# Patient Record
Sex: FEMALE | Race: WHITE | URBAN - METROPOLITAN AREA
[De-identification: names, ages, dates, MRNs, and addresses within clinical notes are randomized per-mention and may not be internally consistent; named-entity substitution may affect disease eponyms.]

---

## 2018-07-20 ENCOUNTER — DOCTOR'S OFFICE (OUTPATIENT)
Dept: URBAN - METROPOLITAN AREA CLINIC 137 | Facility: CLINIC | Age: 43
Setting detail: OPHTHALMOLOGY
End: 2018-07-20
Payer: COMMERCIAL

## 2018-07-20 DIAGNOSIS — H52.13: ICD-10-CM

## 2018-07-20 PROCEDURE — 92014 COMPRE OPH EXAM EST PT 1/>: CPT | Performed by: OPHTHALMOLOGY

## 2018-07-20 ASSESSMENT — REFRACTION_OUTSIDERX
OD_VA1: 20/20
OU_VA: 20/20
OS_CYLINDER: +1.75
OD_VA3: 20/
OD_VA2: 20/20(J1+)
OS_VA1: 20/20
OD_AXIS: 070
OS_SPHERE: -2.00
OD_SPHERE: -2.75
OS_AXIS: 155
OS_VA2: 20/20(J1+)
OD_CYLINDER: +1.00
OS_VA3: 20/

## 2018-07-20 ASSESSMENT — REFRACTION_MANIFEST
OD_VA1: 20/25
OS_CYLINDER: +1.75
OS_VA2: 20/
OD_VA1: 20/
OS_VA1: 20/25
OU_VA: 20/
OD_VA2: 20/
OS_VA3: 20/
OS_SPHERE: -1.25
OS_AXIS: 169
OD_VA3: 20/
OS_VA3: 20/
OS_VA1: 20/
OD_SPHERE: -2.25
OD_VA2: 20/
OS_VA2: 20/
OD_CYLINDER: SPH
OD_VA3: 20/
OU_VA: 20/

## 2018-07-20 ASSESSMENT — REFRACTION_AUTOREFRACTION
OD_AXIS: 066
OS_SPHERE: -3.75
OD_CYLINDER: +0.50
OS_AXIS: 166
OS_CYLINDER: +2.00
OD_SPHERE: -3.25

## 2018-07-20 ASSESSMENT — REFRACTION_CURRENTRX
OD_OVR_VA: 20/
OS_OVR_VA: 20/
OS_OVR_VA: 20/
OD_OVR_VA: 20/
OD_OVR_VA: 20/
OS_OVR_VA: 20/

## 2018-07-20 ASSESSMENT — VISUAL ACUITY
OD_BCVA: 20/50-2
OS_BCVA: 20/40-2

## 2018-07-20 ASSESSMENT — SPHEQUIV_DERIVED
OS_SPHEQUIV: -2.75
OD_SPHEQUIV: -3
OS_SPHEQUIV: -0.375

## 2018-07-20 ASSESSMENT — CONFRONTATIONAL VISUAL FIELD TEST (CVF)
OS_FINDINGS: FULL
OD_FINDINGS: FULL

## 2018-11-12 ENCOUNTER — DOCTOR'S OFFICE (OUTPATIENT)
Dept: URBAN - METROPOLITAN AREA CLINIC 136 | Facility: CLINIC | Age: 43
Setting detail: OPHTHALMOLOGY
End: 2018-11-12
Payer: COMMERCIAL

## 2018-11-12 ENCOUNTER — RX ONLY (RX ONLY)
Age: 43
End: 2018-11-12

## 2018-11-12 DIAGNOSIS — H20.012: ICD-10-CM

## 2018-11-12 PROCEDURE — 92014 COMPRE OPH EXAM EST PT 1/>: CPT | Performed by: OPTOMETRIST

## 2018-11-12 ASSESSMENT — REFRACTION_CURRENTRX
OS_OVR_VA: 20/
OS_OVR_VA: 20/
OD_OVR_VA: 20/
OD_OVR_VA: 20/
OS_OVR_VA: 20/
OD_OVR_VA: 20/

## 2018-11-12 ASSESSMENT — REFRACTION_MANIFEST
OS_CYLINDER: +1.75
OS_CYLINDER: +1.75
OS_VA2: 20/20(J1+)
OD_VA3: 20/
OS_AXIS: 155
OD_VA2: 20/20(J1+)
OU_VA: 20/20
OS_VA2: 20/
OD_VA1: 20/25
OU_VA: 20/
OD_CYLINDER: SPH
OD_SPHERE: -2.25
OD_AXIS: 070
OS_SPHERE: -1.25
OD_SPHERE: -2.75
OD_VA1: 20/20
OD_VA2: 20/
OS_SPHERE: -2.00
OS_VA3: 20/
OS_VA3: 20/
OS_VA1: 20/25
OD_CYLINDER: +1.00
OS_VA1: 20/20
OS_AXIS: 169
OD_VA3: 20/

## 2018-11-12 ASSESSMENT — REFRACTION_AUTOREFRACTION
OD_CYLINDER: +0.50
OD_SPHERE: -3.25
OS_SPHERE: -3.75
OS_CYLINDER: +2.00
OS_AXIS: 166
OD_AXIS: 066

## 2018-11-12 ASSESSMENT — CONFRONTATIONAL VISUAL FIELD TEST (CVF)
OS_FINDINGS: FULL
OD_FINDINGS: FULL

## 2018-11-12 ASSESSMENT — SPHEQUIV_DERIVED
OS_SPHEQUIV: -0.375
OS_SPHEQUIV: -1.125
OS_SPHEQUIV: -2.75
OD_SPHEQUIV: -2.25
OD_SPHEQUIV: -3

## 2018-11-12 ASSESSMENT — VISUAL ACUITY
OS_BCVA: 20/30-2
OD_BCVA: 20/50-

## 2018-11-16 ENCOUNTER — DOCTOR'S OFFICE (OUTPATIENT)
Dept: URBAN - METROPOLITAN AREA CLINIC 137 | Facility: CLINIC | Age: 43
Setting detail: OPHTHALMOLOGY
End: 2018-11-16
Payer: COMMERCIAL

## 2018-11-16 ENCOUNTER — RX ONLY (RX ONLY)
Age: 43
End: 2018-11-16

## 2018-11-16 DIAGNOSIS — H20.012: ICD-10-CM

## 2018-11-16 PROCEDURE — 99213 OFFICE O/P EST LOW 20 MIN: CPT | Performed by: OPTOMETRIST

## 2018-11-16 ASSESSMENT — REFRACTION_MANIFEST
OS_SPHERE: -1.25
OS_SPHERE: -2.00
OS_VA1: 20/25
OS_VA3: 20/
OD_VA2: 20/20(J1+)
OD_CYLINDER: +1.00
OS_CYLINDER: +1.75
OD_SPHERE: -2.25
OD_VA2: 20/
OS_AXIS: 155
OS_VA3: 20/
OS_AXIS: 169
OS_VA1: 20/20
OS_VA2: 20/
OD_SPHERE: -2.75
OD_VA3: 20/
OU_VA: 20/
OD_VA1: 20/25
OD_VA3: 20/
OD_VA1: 20/20
OS_VA2: 20/20(J1+)
OS_CYLINDER: +1.75
OU_VA: 20/20
OD_CYLINDER: SPH
OD_AXIS: 070

## 2018-11-16 ASSESSMENT — REFRACTION_CURRENTRX
OS_OVR_VA: 20/
OD_OVR_VA: 20/
OS_OVR_VA: 20/
OS_OVR_VA: 20/

## 2018-11-16 ASSESSMENT — REFRACTION_AUTOREFRACTION
OD_SPHERE: -3.25
OD_AXIS: 066
OS_SPHERE: -3.75
OS_AXIS: 166
OD_CYLINDER: +0.50
OS_CYLINDER: +2.00

## 2018-11-16 ASSESSMENT — SPHEQUIV_DERIVED
OS_SPHEQUIV: -2.75
OS_SPHEQUIV: -1.125
OD_SPHEQUIV: -2.25
OS_SPHEQUIV: -0.375
OD_SPHEQUIV: -3

## 2018-11-16 ASSESSMENT — VISUAL ACUITY
OD_BCVA: 20/300
OS_BCVA: 20/40

## 2018-11-21 ENCOUNTER — DOCTOR'S OFFICE (OUTPATIENT)
Dept: URBAN - METROPOLITAN AREA CLINIC 137 | Facility: CLINIC | Age: 43
Setting detail: OPHTHALMOLOGY
End: 2018-11-21
Payer: COMMERCIAL

## 2018-11-21 DIAGNOSIS — H20.012: ICD-10-CM

## 2018-11-21 PROCEDURE — 99213 OFFICE O/P EST LOW 20 MIN: CPT | Performed by: OPTOMETRIST

## 2018-11-21 ASSESSMENT — SPHEQUIV_DERIVED
OS_SPHEQUIV: -0.375
OD_SPHEQUIV: -2.25
OS_SPHEQUIV: -2.75
OD_SPHEQUIV: -3
OS_SPHEQUIV: -1.125

## 2018-11-21 ASSESSMENT — VISUAL ACUITY
OS_BCVA: 20/40
OD_BCVA: 20/300

## 2018-11-21 ASSESSMENT — REFRACTION_CURRENTRX
OS_OVR_VA: 20/
OD_OVR_VA: 20/
OS_OVR_VA: 20/
OD_OVR_VA: 20/
OS_OVR_VA: 20/
OD_OVR_VA: 20/

## 2018-11-21 ASSESSMENT — REFRACTION_MANIFEST
OD_CYLINDER: SPH
OD_VA1: 20/25
OD_VA1: 20/20
OD_VA3: 20/
OU_VA: 20/
OS_CYLINDER: +1.75
OD_VA2: 20/
OS_VA3: 20/
OS_SPHERE: -2.00
OS_VA2: 20/
OS_VA2: 20/20(J1+)
OU_VA: 20/20
OD_VA2: 20/20(J1+)
OD_SPHERE: -2.75
OS_AXIS: 155
OD_CYLINDER: +1.00
OS_CYLINDER: +1.75
OD_SPHERE: -2.25
OS_SPHERE: -1.25
OD_AXIS: 070
OS_AXIS: 169
OD_VA3: 20/
OS_VA1: 20/25
OS_VA3: 20/
OS_VA1: 20/20

## 2018-11-21 ASSESSMENT — REFRACTION_AUTOREFRACTION
OS_SPHERE: -3.75
OS_CYLINDER: +2.00
OD_CYLINDER: +0.50
OS_AXIS: 166
OD_SPHERE: -3.25
OD_AXIS: 066

## 2018-11-28 ENCOUNTER — DOCTOR'S OFFICE (OUTPATIENT)
Dept: URBAN - METROPOLITAN AREA CLINIC 137 | Facility: CLINIC | Age: 43
Setting detail: OPHTHALMOLOGY
End: 2018-11-28
Payer: COMMERCIAL

## 2018-11-28 DIAGNOSIS — H20.012: ICD-10-CM

## 2018-11-28 PROCEDURE — 99212 OFFICE O/P EST SF 10 MIN: CPT | Performed by: OPTOMETRIST

## 2018-11-28 ASSESSMENT — CONFRONTATIONAL VISUAL FIELD TEST (CVF)
OS_FINDINGS: FULL
OD_FINDINGS: FULL

## 2018-11-28 ASSESSMENT — REFRACTION_MANIFEST
OD_VA1: 20/25
OS_AXIS: 065
OU_VA: 20/20
OD_SPHERE: -2.25
OD_CYLINDER: -1.00
OS_VA3: 20/
OD_CYLINDER: SPH
OS_CYLINDER: -1.75
OD_VA1: 20/20
OS_SPHERE: -0.25
OS_VA2: 20/
OD_AXIS: 160
OS_VA1: 20/25
OD_VA3: 20/
OD_VA2: 20/20(J1+)
OD_VA2: 20/
OS_VA3: 20/
OS_SPHERE: +0.50
OD_VA3: 20/
OS_VA1: 20/20
OS_AXIS: 079
OU_VA: 20/
OS_CYLINDER: -1.75
OD_SPHERE: -1.75
OS_VA2: 20/20(J1+)

## 2018-11-28 ASSESSMENT — SPHEQUIV_DERIVED
OD_SPHEQUIV: -3
OD_SPHEQUIV: -2.25
OS_SPHEQUIV: -2.75
OS_SPHEQUIV: -0.375
OS_SPHEQUIV: -1.125

## 2018-11-28 ASSESSMENT — REFRACTION_CURRENTRX
OS_OVR_VA: 20/
OD_OVR_VA: 20/

## 2018-11-28 ASSESSMENT — REFRACTION_AUTOREFRACTION
OS_CYLINDER: +2.00
OD_AXIS: 066
OS_AXIS: 166
OD_CYLINDER: +0.50
OS_SPHERE: -3.75
OD_SPHERE: -3.25

## 2018-11-28 ASSESSMENT — VISUAL ACUITY
OD_BCVA: 20/100
OS_BCVA: 20/80+2

## 2019-06-07 ENCOUNTER — DOCTOR'S OFFICE (OUTPATIENT)
Dept: URBAN - METROPOLITAN AREA CLINIC 137 | Facility: CLINIC | Age: 44
Setting detail: OPHTHALMOLOGY
End: 2019-06-07
Payer: COMMERCIAL

## 2019-06-07 DIAGNOSIS — H20.012: ICD-10-CM

## 2019-06-07 PROCEDURE — 99213 OFFICE O/P EST LOW 20 MIN: CPT | Performed by: OPTOMETRIST

## 2019-06-07 ASSESSMENT — REFRACTION_MANIFEST
OU_VA: 20/
OD_VA1: 20/20
OS_CYLINDER: -1.75
OS_SPHERE: -0.25
OS_VA3: 20/
OD_VA2: 20/20(J1+)
OD_VA3: 20/
OD_CYLINDER: SPH
OS_AXIS: 079
OD_VA3: 20/
OS_VA3: 20/
OS_VA1: 20/25
OD_SPHERE: -1.75
OD_VA2: 20/
OS_CYLINDER: -1.75
OS_VA2: 20/20(J1+)
OU_VA: 20/20
OD_CYLINDER: -1.00
OD_SPHERE: -2.25
OS_SPHERE: +0.50
OS_VA1: 20/20
OD_AXIS: 160
OD_VA1: 20/25
OS_AXIS: 065
OS_VA2: 20/

## 2019-06-07 ASSESSMENT — SPHEQUIV_DERIVED
OD_SPHEQUIV: -2.25
OD_SPHEQUIV: -3
OS_SPHEQUIV: -0.375
OS_SPHEQUIV: -1.125
OS_SPHEQUIV: -2.75

## 2019-06-07 ASSESSMENT — VISUAL ACUITY
OD_BCVA: 20/100
OS_BCVA: 20/80+2

## 2019-06-07 ASSESSMENT — REFRACTION_CURRENTRX
OS_OVR_VA: 20/
OD_OVR_VA: 20/
OS_OVR_VA: 20/
OS_OVR_VA: 20/
OD_OVR_VA: 20/
OD_OVR_VA: 20/

## 2019-06-07 ASSESSMENT — REFRACTION_AUTOREFRACTION
OS_CYLINDER: +2.00
OD_SPHERE: -3.25
OD_AXIS: 066
OS_SPHERE: -3.75
OD_CYLINDER: +0.50
OS_AXIS: 166

## 2019-06-12 ENCOUNTER — DOCTOR'S OFFICE (OUTPATIENT)
Dept: URBAN - METROPOLITAN AREA CLINIC 137 | Facility: CLINIC | Age: 44
Setting detail: OPHTHALMOLOGY
End: 2019-06-12
Payer: COMMERCIAL

## 2019-06-12 DIAGNOSIS — H20.012: ICD-10-CM

## 2019-06-12 PROCEDURE — 99212 OFFICE O/P EST SF 10 MIN: CPT | Performed by: OPTOMETRIST

## 2019-06-12 ASSESSMENT — REFRACTION_MANIFEST
OS_VA3: 20/
OD_SPHERE: -1.75
OD_VA1: 20/25
OS_VA2: 20/
OS_VA3: 20/
OS_SPHERE: +0.50
OS_VA2: 20/20(J1+)
OD_CYLINDER: -1.00
OS_CYLINDER: -1.75
OS_AXIS: 079
OD_AXIS: 160
OU_VA: 20/
OD_VA3: 20/
OS_AXIS: 065
OS_CYLINDER: -1.75
OD_SPHERE: -2.25
OS_VA1: 20/20
OD_VA1: 20/20
OU_VA: 20/20
OD_VA2: 20/20(J1+)
OD_VA3: 20/
OD_VA2: 20/
OS_SPHERE: -0.25
OS_VA1: 20/25
OD_CYLINDER: SPH

## 2019-06-12 ASSESSMENT — CONFRONTATIONAL VISUAL FIELD TEST (CVF)
OD_FINDINGS: FULL
OS_FINDINGS: FULL

## 2019-06-12 ASSESSMENT — SPHEQUIV_DERIVED
OS_SPHEQUIV: -0.375
OD_SPHEQUIV: -3
OS_SPHEQUIV: -2.75
OD_SPHEQUIV: -2.25
OS_SPHEQUIV: -1.125

## 2019-06-12 ASSESSMENT — REFRACTION_AUTOREFRACTION
OD_AXIS: 066
OD_CYLINDER: +0.50
OS_SPHERE: -3.75
OD_SPHERE: -3.25
OS_CYLINDER: +2.00
OS_AXIS: 166

## 2019-06-12 ASSESSMENT — REFRACTION_CURRENTRX
OS_OVR_VA: 20/
OS_OVR_VA: 20/
OD_OVR_VA: 20/
OS_OVR_VA: 20/
OD_OVR_VA: 20/
OD_OVR_VA: 20/

## 2019-06-12 ASSESSMENT — VISUAL ACUITY
OS_BCVA: 20/80
OD_BCVA: 20/50

## 2020-02-26 ENCOUNTER — TRANSCRIBE ORDERS (OUTPATIENT)
Dept: ADMINISTRATIVE | Facility: HOSPITAL | Age: 45
End: 2020-02-26

## 2020-02-26 DIAGNOSIS — R05.9 COUGH: Primary | ICD-10-CM

## 2021-04-08 DIAGNOSIS — Z23 ENCOUNTER FOR IMMUNIZATION: ICD-10-CM

## 2022-02-12 PROBLEM — E55.9 VITAMIN D DEFICIENCY: Status: ACTIVE | Noted: 2022-02-12

## 2022-02-12 PROBLEM — R80.9 PROTEINURIA: Status: ACTIVE | Noted: 2022-02-12

## 2022-02-17 ENCOUNTER — OFFICE VISIT (OUTPATIENT)
Dept: FAMILY MEDICINE CLINIC | Facility: CLINIC | Age: 47
End: 2022-02-17
Payer: COMMERCIAL

## 2022-02-17 VITALS
HEART RATE: 68 BPM | BODY MASS INDEX: 31.36 KG/M2 | OXYGEN SATURATION: 99 % | HEIGHT: 63 IN | SYSTOLIC BLOOD PRESSURE: 118 MMHG | RESPIRATION RATE: 16 BRPM | DIASTOLIC BLOOD PRESSURE: 78 MMHG | WEIGHT: 177 LBS | TEMPERATURE: 97.1 F

## 2022-02-17 DIAGNOSIS — R79.89 LOW SERUM T4 LEVEL: Primary | ICD-10-CM

## 2022-02-17 DIAGNOSIS — E55.9 VITAMIN D DEFICIENCY: ICD-10-CM

## 2022-02-17 DIAGNOSIS — R80.9 PROTEINURIA, UNSPECIFIED TYPE: ICD-10-CM

## 2022-02-17 PROCEDURE — 3008F BODY MASS INDEX DOCD: CPT | Performed by: FAMILY MEDICINE

## 2022-02-17 PROCEDURE — 1036F TOBACCO NON-USER: CPT | Performed by: FAMILY MEDICINE

## 2022-02-17 PROCEDURE — 99214 OFFICE O/P EST MOD 30 MIN: CPT | Performed by: FAMILY MEDICINE

## 2022-02-17 PROCEDURE — 3725F SCREEN DEPRESSION PERFORMED: CPT | Performed by: FAMILY MEDICINE

## 2022-02-17 NOTE — PROGRESS NOTES
BMI Counseling: Body mass index is 31 35 kg/m²  The BMI is above normal  Nutrition recommendations include decreasing portion sizes, encouraging healthy choices of fruits and vegetables, consuming healthier snacks and moderation in carbohydrate intake  Exercise recommendations include exercising 3-5 times per week  No pharmacotherapy was ordered  Rationale for BMI follow-up plan is due to patient being overweight or obese  Depression Screening and Follow-up Plan: Patient was screened for depression during today's encounter  They screened negative with a PHQ-2 score of 0  Assessment/Plan:         Problem List Items Addressed This Visit        Other    Vitamin D deficiency     Level was 24 4 in Nov 2021  Pt now taking 2000 U qd and will recheck in 3 mths  Relevant Orders    Vitamin D 25 hydroxy    Proteinuria     Pt had positive protein in urine and positive microalbuminuria in Nov 2021  Will recheck and T/C referral to Nephrology  Relevant Orders    Microalbumin / creatinine urine ratio    UA (URINE) with reflex to Scope    Low serum T4 level - Primary     Level was 0 61 in Nov 2021  Recheck TSH and free T4  Relevant Orders    TSH, 3rd generation    T4, free            Subjective:      Patient ID: Juanjo Padilla is a 40 y o  female  Pt here for f/u abnormal labs  Pt had testing in Nov 2021 for executive physical  Pt was found to have low T4, low Vit D, and protein in urine  No cp/sob  No headaches  No abd pain  Energy ok  No FH of hypothyroidism or kidney isues  No frequent urination, back pain, or cloudy urine  No other c/o         The following portions of the patient's history were reviewed and updated as appropriate:   Past Medical History:  She has no past medical history on file ,  _______________________________________________________________________  Medical Problems:  does not have any pertinent problems on file ,  _______________________________________________________________________  Past Surgical History:   has a past surgical history that includes Joint replacement  ,  _______________________________________________________________________  Family History:  family history includes Hyperlipidemia in her mother; Lung cancer in her father ,  _______________________________________________________________________  Social History:   reports that she has never smoked  She has never used smokeless tobacco  She reports current alcohol use  She reports that she does not use drugs  ,  _______________________________________________________________________  Allergies:  has No Known Allergies     _______________________________________________________________________  No current outpatient medications on file  No current facility-administered medications for this visit      _______________________________________________________________________  Review of Systems   Constitutional: Negative for fatigue and unexpected weight change  Respiratory: Negative for cough, chest tightness and shortness of breath  Cardiovascular: Negative for chest pain and palpitations  Gastrointestinal: Negative for abdominal pain, constipation, diarrhea, nausea and vomiting  Genitourinary: Negative for difficulty urinating  Musculoskeletal: Negative for arthralgias  Skin: Negative for rash  Neurological: Negative for dizziness and headaches  Objective:  Vitals:    02/17/22 1453   BP: 118/78   BP Location: Left arm   Patient Position: Sitting   Cuff Size: Standard   Pulse: 68   Resp: 16   Temp: (!) 97 1 °F (36 2 °C)   TempSrc: Temporal   SpO2: 99%   Weight: 80 3 kg (177 lb)   Height: 5' 3" (1 6 m)     Body mass index is 31 35 kg/m²  Physical Exam  Vitals and nursing note reviewed  Constitutional:       Appearance: Normal appearance  She is well-developed  HENT:      Head: Normocephalic and atraumatic     Cardiovascular: Rate and Rhythm: Normal rate and regular rhythm  Heart sounds: Normal heart sounds  No murmur heard  Pulmonary:      Effort: Pulmonary effort is normal  No respiratory distress  Breath sounds: Normal breath sounds  No wheezing  Musculoskeletal:      Cervical back: Normal range of motion and neck supple  Right lower leg: No edema  Left lower leg: No edema  Lymphadenopathy:      Cervical: No cervical adenopathy  Neurological:      Mental Status: She is alert and oriented to person, place, and time  Psychiatric:         Mood and Affect: Mood normal          Behavior: Behavior normal          Thought Content:  Thought content normal          Judgment: Judgment normal

## 2022-02-17 NOTE — ASSESSMENT & PLAN NOTE
Pt had positive protein in urine and positive microalbuminuria in Nov 2021  Will recheck and T/C referral to Nephrology

## 2022-02-21 ENCOUNTER — TELEPHONE (OUTPATIENT)
Dept: FAMILY MEDICINE CLINIC | Facility: CLINIC | Age: 47
End: 2022-02-21

## 2022-03-01 DIAGNOSIS — N63.0 BREAST NODULE: Primary | ICD-10-CM

## 2022-03-10 ENCOUNTER — CLINICAL SUPPORT (OUTPATIENT)
Dept: FAMILY MEDICINE CLINIC | Facility: CLINIC | Age: 47
End: 2022-03-10

## 2022-03-10 DIAGNOSIS — R80.9 PROTEINURIA, UNSPECIFIED TYPE: Primary | ICD-10-CM

## 2022-03-10 LAB
BACTERIA UR QL AUTO: ABNORMAL /HPF
BILIRUB UR QL STRIP: NEGATIVE
CLARITY UR: CLEAR
COLOR UR: ABNORMAL
CREAT UR-MCNC: 171 MG/DL
GLUCOSE UR STRIP-MCNC: NEGATIVE MG/DL
HGB UR QL STRIP.AUTO: ABNORMAL
HYALINE CASTS #/AREA URNS LPF: ABNORMAL /LPF
KETONES UR STRIP-MCNC: NEGATIVE MG/DL
LEUKOCYTE ESTERASE UR QL STRIP: NEGATIVE
MICROALBUMIN UR-MCNC: 897 MG/L (ref 0–20)
MICROALBUMIN/CREAT 24H UR: 525 MG/G CREATININE (ref 0–30)
MUCOUS THREADS UR QL AUTO: ABNORMAL
NITRITE UR QL STRIP: NEGATIVE
NON-SQ EPI CELLS URNS QL MICRO: ABNORMAL /HPF
PH UR STRIP.AUTO: 6 [PH]
PROT UR STRIP-MCNC: ABNORMAL MG/DL
RBC #/AREA URNS AUTO: ABNORMAL /HPF
SP GR UR STRIP.AUTO: 1.02 (ref 1–1.03)
UROBILINOGEN UR STRIP-ACNC: <2 MG/DL
WBC #/AREA URNS AUTO: ABNORMAL /HPF

## 2022-03-10 PROCEDURE — 82570 ASSAY OF URINE CREATININE: CPT | Performed by: FAMILY MEDICINE

## 2022-03-10 PROCEDURE — 81001 URINALYSIS AUTO W/SCOPE: CPT

## 2022-03-10 PROCEDURE — 82043 UR ALBUMIN QUANTITATIVE: CPT | Performed by: FAMILY MEDICINE

## 2022-03-10 PROCEDURE — 87086 URINE CULTURE/COLONY COUNT: CPT | Performed by: FAMILY MEDICINE

## 2022-03-11 ENCOUNTER — TELEPHONE (OUTPATIENT)
Dept: NEPHROLOGY | Facility: CLINIC | Age: 47
End: 2022-03-11

## 2022-03-11 NOTE — TELEPHONE ENCOUNTER
New Patient Intake Form   Patient Details   Leroy Lerma     1975     [id number]     Appointment Information   Who is calling to schedule? If not patient, what is callers name? Patient    Referring Provider  Dr Rosario Alvarez   Referring Provider Number [id number]   Reason for Appt (Diagnosis) Protein in urine   Is patient aware of why they are being referred? yes   Does Patient have labs done at [place] [address]? If not, where do they go? no - unsure where she has gone   Has patient had labs / urine work done? List date of most recent lab / urine work yes    Has patient had a BMP & CBC done in the past 2 years? If so, list the date no    Has patient been hospitalized recently? If yes, list name and location of hospital they were in no    Has patient been seen by a Nephrologist before? If yes, list name, location and phone number  no   Has patient been see by another Specialty before (ex  Neurology, urology, cardiology)? If yes, please list name, and specialty  no   Has the patient had imaging done? If so, list the most recent date and type of imagineg no    Does the patient has a stone analysis report if history of kidney stones?   no   Appointment Details   Is there a referral on file? no    Appointment Date  4/19   Location [facility]   patient states she has not had much lab work done ever

## 2022-03-12 LAB — BACTERIA UR CULT: NORMAL

## 2022-04-11 PROBLEM — N18.1 STAGE 1 CHRONIC KIDNEY DISEASE: Status: ACTIVE | Noted: 2022-04-11

## 2022-04-11 PROBLEM — R31.29 MICROSCOPIC HEMATURIA: Status: ACTIVE | Noted: 2022-04-11

## 2022-04-11 NOTE — PROGRESS NOTES
Consultation - Nephrology 4/19/2022        History of Present Illness   Reason for Consult / Principal Problem:  Proteinuria/albuminuria    HPI: Beto Becerra is a 40y o  year old female with a history of obesity/vitamin-D deficiency we are asked to see regarding grow albuminuria:    Patient denies any prior kidney disease  She denies any kidney stones, urinary tract infections, bladder problems  She denies any current dysuria hematuria voiding symptoms or foamy urine  She denies any NSAID use  She denies any significant joint pains or myalgias, unusual skin rash except for Raynaud's, no paresthesias    She denies any history of diabetes mellitus or hypertension        Pertinent labs:  · UA from 03/10/2022:  2+ proteinuria/10-20 RBCs/1-2 WBCs  · Urine microalbumin creatinine ratio other 25 mg/g  · Creatinine:  1 0 on 11/03/2021  · Sodium 134 otherwise rest of electrolytes normal  · Albumin 4 1/total protein 6 8/calcium 8 9  · Liver function studies normal  · Lipid profile:  /HDL 61/triglycerides 93  · CBC normal including a hemoglobin of 13 4  · Vitamin-D 24 4  · UA:  Moderate occult blood, 10-15 RBCs urine protein 2+  · Elevated urine microalbumin        Review of systems:    General:  No fevers chills cough or colds  Appetite energy are good  Weight is stable    Eyes:  Patient with occasional iritis  Cardiovascular:  No chest pain or shortness of breath or leg swelling  Respiratory:  No coughing or wheezing  Gastrointestinal:  No nausea vomiting diarrhea abdominal pain or bright red blood per rectum  Genitourinary:  See HPI  Neurology:  No headaches, no dizziness or lightheadedness upon standing  All other systems were reviewed and are negative    Historical Information   Past medical history:  · No significant history including CAD/CHF/hypertension/diabetes mellitus/cancer/asthma/emphysema/liver disease/anemia/thyroid disease      Past Surgical History:   Procedure Laterality Date    HIP SURGERY Right Hip repair/realignment surgery     Social History   Social History     Substance and Sexual Activity   Alcohol Use Yes    Comment: occ     Social History     Substance and Sexual Activity   Drug Use Never     Social History     Tobacco Use   Smoking Status Never Smoker   Smokeless Tobacco Never Used   · Does occasional eat some salt in the food but does not add salt to food  · No dedicated exercise      Family History   Problem Relation Age of Onset    Hyperlipidemia Mother     Lung cancer Father    · Mother with hypertension  · No family history of kidney disease    Meds/Allergies   all current active meds have been reviewed, current meds: No current outpatient medications on file  No Known Allergies    Objective   BP sitting on right:  120/80 with a heart rate of 64 regular  BP sitting on left:  120/78 with a heart rate of 64 regular  BP standing on left:  142/80 with a heart rate 68 and regular  Body mass index is 31 89 kg/m²      General:  Well-developed well-nourished no acute distress  Skin:  No acute rash  Eyes:  No scleral icterus, noninjected, conjunctiva appear normal, no discharge from the eyes  ENT:  Normocephalic/atraumatic, mucous membranes moist, tongue appears normal size  Neck:  Supple, no jugular venous distention, 2+ carotid upstroke, no carotid bruits; trachea is midline, no thyromegaly; no lymphadenopathy  Back:  No CVA tenderness, spine appears midline without any overt abnormalities  Chest:  Clear to auscultation and percussion, good respiratory effort, no use of accessory respiratory muscles  CVS:  Regular rate and rhythm without a murmur rub or gallops appreciable  Abdomen/gastrointestinal:  Normal bowel sounds, nontender and nondistended without hepatosplenomegaly or bruits; no masses appreciable  Extremities:  No clubbing, no cyanosis, no edema, 2+ dorsalis pedis pulses, no femoral bruits, no arthritic changes and full range of motion  Neuro:  No gross focality  Psych:  Alert, oriented and appropriate    Current Weight:   Weight (last 2 days)     Date/Time Weight    04/19/22 0850 81 6 (180)            Lab Results:  I have personally reviewed pertinent labs  Results for orders placed or performed in visit on 04/19/22   POCT urine dip   Result Value Ref Range    LEUKOCYTE ESTERASE,UA neg     NITRITE,UA neg     SL AMB POCT UROBILINOGEN 0 2     POCT URINE PROTEIN 100      PH,UA 5 0     BLOOD,UA +++     SPECIFIC GRAVITY,UA 1 030     KETONES,UA neg     BILIRUBIN,UA neg     GLUCOSE, UA neg      COLOR,UA yellow     CLARITY,UA clear        UA microscopic by myself personally today:  Essentially no cells 0-1 RBCs at most per high-power field, no significant crystals casts seen  :      ASSESSMENT AND PLAN:  40y o  year old female with a history of mild obesity/vitamin-D deficiency we are asked to see regarding grow albuminuria:      1  CKD stage 1     Etiology:  Possible glomerular process/vasculitis given microscopic hematuria and proteinuria/micro albuminuria  Of note patient with iritis, no overt uveitis by history however she does use steroid drops occasional oral steroids  ? RUBEN   Baseline creatinine:1 0  Recommend:   Workup:  o Follow-up chemistry  o UA with microscopic:  Please see above, will sent to lab given discordance  o Urine protein creatinine ratio  o Mineral bone disorder evaluation from CKD including magnesium/phosphorus/PTH intact level/vitamin-D level  o CBC  o Lipid profile  o Renal ultrasound with PVR  o Renal artery duplex to rule out renal artery stenosis  o Serologies  o CK to make sure no evidence rhabdomyolysis given discordant urine  o Also obtain formal 24 hour urine for protein and creatinine clearance as a baseline     Treatment:  o Treat hypertension, please see below for recommendations  o Treat dyslipidemia  o Avoid nephrotoxic agents such as NSAIDs, and proton pump inhibitors as able; patient counseled as such  o Good overall health recommendations including weight loss as appropriate, isotonic exercise as able, and avoidance of salt; patient counseled as such  o The patient may require a kidney biopsy depending upon the above serologies in follow-up workup including follow-up urine protein creatinine ratio/UA with microscopic exam   Also potentially would need urology consultation given hematuria pending repeat UA in the lab  This was discussed fully with the patient and her   I answered all questions to their satisfaction  Further workup and treatment recommendations will be forthcoming depending upon the above results and course    2  Hypertension:   Goal:  Less than 130/80-possibly less 125/75 given CKD; may be lower based on proteinuria   Push nonmedical regimen as outlined above   Potential use of an ACE-inhibitor/ARB given proteinuria   Medication changes today:  None pending home readings but again potential use for ACE-inhibitor ARB depending upon the proteinuria    3  Other problems:   Mild Obesity   Vitamin-D deficiency        Patient Instructions   1  Medication changes today:   None at this time pending home blood pressure readings    2  Please go for  fasting  lab work at this time  3  This will also include a 24 hour urine collection to be done approximately the same time as the lab work  4  Please go for an ultrasound of your kidneys at this time     5  Please purchase an Omron blood pressure machine-Omron 10 series through the Internet/[employer]:  Then Please take 1 week a blood pressure readings  at this time    AS FOLLOWS  MORNING AND EVENING, SITTING AND STANDING as follows:  · TAKE THE MORNING READINGS BEFORE ANY MEDICATIONS AND WHEN YOU ARE RELAXED FOR SEVERAL MINUTES  · TAKE THE EVENING READINGS:  BETWEEN 7-10 P M ; PRIOR TO ANY MEDICATIONS; AT LEAST IN OUR  FROM DINNER; AND CERTAINLY AFTER RELAXING FOR A FEW MINUTES  · PLEASE INCLUDE HEART RATE WITH YOUR BLOOD PRESSURE READINGS  · When taking standing readings, keep your arm supported at heart level and not dangling  · Make sure you are sitting with your back supported and feet on the ground and do not cross your legs or feet  · Make sure you have not taken any coffee or caffeine products or exercised or smoke cigarettes at least 30 minutes before taking your blood pressure  Then please mail these readings into the office    6  Follow-up in 3  months   Please bring in 1 week a blood pressure readings morning evening, sitting and standing is outlined above   PLEASE BRING AN YOUR BLOOD PRESSURE MACHINE TO CORRELATE WITH THE OFFICE MACHINE AT THIS NEXT SCHEDULED VISIT   Please go for fasting lab work 1-2 weeks prior to your appointment      7  General instructions:   AVOID SALT BUT NOT ADDING AN READING LABELS TO MAKE SURE THERE IS LOW-SALT IN THE FOOD THAT YOU ARE EATING  o Goal is less than 2 g of sodium intake or less than 5 g of sodium chloride intake per day     Avoid nonsteroidal anti-inflammatory drugs such as Naprosyn, ibuprofen, Aleve, Advil, Celebrex, Meloxicam (Mobic) etc   You can use Tylenol as needed if you do not have any liver condition to be concerned about     Avoid medications such as Sudafed or decongestants and antihistamines that contained the D component which is the decongestant  You can take antihistamines without the decongestant or D component   Try to avoid medications such as pantoprazole or  Protonix/Nexium or Esomeprazole)/Prilosec or omeprazole/Prevacid or lansoprazole/AcipHex or Rabeprazole  If you are able to, use Pepcid as this is safer for your kidneys   Try to exercise at least 30 minutes 3 days a week to begin with with an ultimate goal of 5 days a week for at least 30 minutes     Try to lose 5-10 lb by your next visit     Please do not drink more than 2 glasses of alcohol/wine on a daily basis as this may contribute to your high blood pressure        8  Depending upon the laboratory studies we very likely be proceed with a kidney biopsy  Portions of the record may have been created with voice recognition software  Occasional wrong word or "sound a like" substitutions may have occurred due to the inherent limitations of voice recognition software  Read the chart carefully and recognize, using context, where substitutions have occurred      Anayeli Hernandez MD

## 2022-04-19 ENCOUNTER — OFFICE VISIT (OUTPATIENT)
Dept: NEPHROLOGY | Facility: CLINIC | Age: 47
End: 2022-04-19
Payer: COMMERCIAL

## 2022-04-19 VITALS — BODY MASS INDEX: 31.89 KG/M2 | HEIGHT: 63 IN | WEIGHT: 180 LBS

## 2022-04-19 DIAGNOSIS — E55.9 VITAMIN D DEFICIENCY: ICD-10-CM

## 2022-04-19 DIAGNOSIS — R31.29 MICROSCOPIC HEMATURIA: ICD-10-CM

## 2022-04-19 DIAGNOSIS — R80.1 PERSISTENT PROTEINURIA: Primary | ICD-10-CM

## 2022-04-19 DIAGNOSIS — N18.1 STAGE 1 CHRONIC KIDNEY DISEASE: ICD-10-CM

## 2022-04-19 LAB
SL AMB  POCT GLUCOSE, UA: ABNORMAL
SL AMB LEUKOCYTE ESTERASE,UA: ABNORMAL
SL AMB POCT BILIRUBIN,UA: ABNORMAL
SL AMB POCT BLOOD,UA: ABNORMAL
SL AMB POCT CLARITY,UA: CLEAR
SL AMB POCT COLOR,UA: YELLOW
SL AMB POCT KETONES,UA: ABNORMAL
SL AMB POCT NITRITE,UA: ABNORMAL
SL AMB POCT PH,UA: 5
SL AMB POCT SPECIFIC GRAVITY,UA: 1.03
SL AMB POCT URINE PROTEIN: 100
SL AMB POCT UROBILINOGEN: 0.2

## 2022-04-19 PROCEDURE — 81002 URINALYSIS NONAUTO W/O SCOPE: CPT | Performed by: INTERNAL MEDICINE

## 2022-04-19 PROCEDURE — 3008F BODY MASS INDEX DOCD: CPT | Performed by: INTERNAL MEDICINE

## 2022-04-19 PROCEDURE — 99205 OFFICE O/P NEW HI 60 MIN: CPT | Performed by: INTERNAL MEDICINE

## 2022-04-19 PROCEDURE — 1036F TOBACCO NON-USER: CPT | Performed by: INTERNAL MEDICINE

## 2022-04-19 NOTE — PATIENT INSTRUCTIONS
1  Medication changes today:   None at this time pending home blood pressure readings    2  Please go for  fasting  lab work at this time  3  This will also include a 24 hour urine collection to be done approximately the same time as the lab work  4  Please go for an ultrasound of your kidneys at this time     5  Please purchase an Omron blood pressure machine-Omron 10 series through the Internet/[employer]: Then Please take 1 week a blood pressure readings  at this time    AS FOLLOWS  MORNING AND EVENING, SITTING AND STANDING as follows:  · TAKE THE MORNING READINGS BEFORE ANY MEDICATIONS AND WHEN YOU ARE RELAXED FOR SEVERAL MINUTES  · TAKE THE EVENING READINGS:  BETWEEN 7-10 P M ; PRIOR TO ANY MEDICATIONS; AT LEAST IN OUR  FROM DINNER; AND CERTAINLY AFTER RELAXING FOR A FEW MINUTES  · PLEASE INCLUDE HEART RATE WITH YOUR BLOOD PRESSURE READINGS  · When taking standing readings, keep your arm supported at heart level and not dangling  · Make sure you are sitting with your back supported and feet on the ground and do not cross your legs or feet  · Make sure you have not taken any coffee or caffeine products or exercised or smoke cigarettes at least 30 minutes before taking your blood pressure  Then please mail these readings into the office    6  Follow-up in 3  months   Please bring in 1 week a blood pressure readings morning evening, sitting and standing is outlined above   PLEASE BRING AN YOUR BLOOD PRESSURE MACHINE TO CORRELATE WITH THE OFFICE MACHINE AT THIS NEXT SCHEDULED VISIT   Please go for fasting lab work 1-2 weeks prior to your appointment      7   General instructions:   AVOID SALT BUT NOT ADDING AN READING LABELS TO MAKE SURE THERE IS LOW-SALT IN THE FOOD THAT YOU ARE EATING  o Goal is less than 2 g of sodium intake or less than 5 g of sodium chloride intake per day     Avoid nonsteroidal anti-inflammatory drugs such as Naprosyn, ibuprofen, Aleve, Advil, Celebrex, Meloxicam (Mobic) etc   You can use Tylenol as needed if you do not have any liver condition to be concerned about     Avoid medications such as Sudafed or decongestants and antihistamines that contained the D component which is the decongestant  You can take antihistamines without the decongestant or D component   Try to avoid medications such as pantoprazole or  Protonix/Nexium or Esomeprazole)/Prilosec or omeprazole/Prevacid or lansoprazole/AcipHex or Rabeprazole  If you are able to, use Pepcid as this is safer for your kidneys   Try to exercise at least 30 minutes 3 days a week to begin with with an ultimate goal of 5 days a week for at least 30 minutes     Try to lose 5-10 lb by your next visit     Please do not drink more than 2 glasses of alcohol/wine on a daily basis as this may contribute to your high blood pressure  8  Depending upon the laboratory studies we very likely be proceed with a kidney biopsy

## 2022-04-28 ENCOUNTER — HOSPITAL ENCOUNTER (OUTPATIENT)
Dept: ULTRASOUND IMAGING | Facility: HOSPITAL | Age: 47
Discharge: HOME | End: 2022-04-28
Attending: INTERNAL MEDICINE
Payer: COMMERCIAL

## 2022-04-28 DIAGNOSIS — N18.1 STAGE 1 CHRONIC KIDNEY DISEASE: ICD-10-CM

## 2022-04-28 DIAGNOSIS — R80.1 PERSISTENT PROTEINURIA: ICD-10-CM

## 2022-04-28 DIAGNOSIS — E55.9 VITAMIN D DEFICIENCY: ICD-10-CM

## 2022-04-28 DIAGNOSIS — R31.29 MICROSCOPIC HEMATURIA: ICD-10-CM

## 2022-04-28 LAB
CREAT 24H UR-MRATE: 1546 MG/24 HR (ref 800–1800)
CREAT CL 24H UR+SERPL-VRATE: 123 ML/MIN (ref 88–128)
CREAT SERPL-MCNC: 0.87 MG/DL (ref 0.57–1)
CREAT UR-MCNC: 106.6 MG/DL
EGFR: 84 ML/MIN/1.73
INR PPP: 1 (ref 0.9–1.2)
PROTHROMBIN TIME: 10.4 SEC (ref 9.1–12)

## 2022-04-28 PROCEDURE — 76770 US EXAM ABDO BACK WALL COMP: CPT

## 2022-04-29 LAB
25(OH)D3+25(OH)D2 SERPL-MCNC: 33.8 NG/ML (ref 30–100)
ALBUMIN MFR UR ELPH: 68.7 %
ALBUMIN SERPL ELPH-MCNC: 3.8 G/DL (ref 2.9–4.4)
ALBUMIN SERPL-MCNC: 4.2 G/DL (ref 3.8–4.8)
ALBUMIN/GLOB SERPL: 1.3 {RATIO} (ref 0.7–1.7)
ALBUMIN/GLOB SERPL: 1.7 {RATIO} (ref 1.2–2.2)
ALP SERPL-CCNC: 91 IU/L (ref 44–121)
ALPHA1 GLOB MFR UR ELPH: 5.3 %
ALPHA1 GLOB SERPL ELPH-MCNC: 0.2 G/DL (ref 0–0.4)
ALPHA2 GLOB MFR UR ELPH: 4.4 %
ALPHA2 GLOB SERPL ELPH-MCNC: 0.6 G/DL (ref 0.4–1)
ALT SERPL-CCNC: 16 IU/L (ref 0–32)
ANA TITR SER IF: NEGATIVE {TITER}
APPEARANCE UR: CLEAR
ASO AB SERPL-ACNC: 55.5 IU/ML (ref 0–200)
AST SERPL-CCNC: 17 IU/L (ref 0–40)
B-GLOBULIN MFR UR ELPH: 12.3 %
B-GLOBULIN SERPL ELPH-MCNC: 1 G/DL (ref 0.7–1.3)
BACTERIA URNS QL MICRO: ABNORMAL
BILIRUB SERPL-MCNC: 0.9 MG/DL (ref 0–1.2)
BILIRUB UR QL STRIP: NEGATIVE
BUN SERPL-MCNC: 11 MG/DL (ref 6–24)
BUN/CREAT SERPL: 12 (ref 9–23)
C-ANCA TITR SER IF: NORMAL TITER
C3 SERPL-MCNC: 123 MG/DL (ref 82–167)
C4 SERPL-MCNC: 29 MG/DL (ref 12–38)
CALCIUM SERPL-MCNC: 9 MG/DL (ref 8.7–10.2)
CASTS URNS QL MICRO: ABNORMAL /LPF
CHLORIDE SERPL-SCNC: 102 MMOL/L (ref 96–106)
CHOLEST SERPL-MCNC: 185 MG/DL (ref 100–199)
CK SERPL-CCNC: 114 U/L (ref 32–182)
CO2 SERPL-SCNC: 24 MMOL/L (ref 20–29)
COLOR UR: YELLOW
CREAT SERPL-MCNC: 0.94 MG/DL (ref 0.57–1)
CREAT UR-MCNC: 160.4 MG/DL
CRP SERPL-MCNC: 3 MG/L (ref 0–10)
DSDNA AB SER-ACNC: 5 IU/ML (ref 0–9)
EGFR: 76 ML/MIN/1.73
EPI CELLS #/AREA URNS HPF: >10 /HPF (ref 0–10)
ERYTHROCYTE [DISTWIDTH] IN BLOOD BY AUTOMATED COUNT: 12.6 % (ref 11.7–15.4)
ERYTHROCYTE [SEDIMENTATION RATE] IN BLOOD BY WESTERGREN METHOD: 7 MM/HR (ref 0–32)
GAMMA GLOB MFR UR ELPH: 9.3 %
GAMMA GLOB SERPL ELPH-MCNC: 1 G/DL (ref 0.4–1.8)
GBM AB SER IA-ACNC: 4 UNITS (ref 0–20)
GLOBULIN SER CALC-MCNC: 2.9 G/DL (ref 2.2–3.9)
GLOBULIN SER-MCNC: 2.5 G/DL (ref 1.5–4.5)
GLUCOSE SERPL-MCNC: 95 MG/DL (ref 65–99)
GLUCOSE UR QL: NEGATIVE
HCT VFR BLD AUTO: 40.3 % (ref 34–46.6)
HCV AB S/CO SERPL IA: <0.1 S/CO RATIO (ref 0–0.9)
HDLC SERPL-MCNC: 56 MG/DL
HGB BLD-MCNC: 13.2 G/DL (ref 11.1–15.9)
HGB UR QL STRIP: ABNORMAL
IGA SERPL-MCNC: 298 MG/DL (ref 87–352)
KAPPA LC FREE SER-MCNC: 18.8 MG/L (ref 3.3–19.4)
KAPPA LC FREE/LAMBDA FREE SER: 0.9 {RATIO} (ref 0.26–1.65)
KETONES UR QL STRIP: NEGATIVE
LABORATORY COMMENT REPORT: NORMAL
LABORATORY COMMENT REPORT: NORMAL
LAMBDA LC FREE SERPL-MCNC: 21 MG/L (ref 5.7–26.3)
LDLC SERPL CALC-MCNC: 112 MG/DL (ref 0–99)
LDLC/HDLC SERPL: 2 RATIO (ref 0–3.2)
LEUKOCYTE ESTERASE UR QL STRIP: NEGATIVE
M PROTEIN MFR UR ELPH: NORMAL %
M PROTEIN SERPL ELPH-MCNC: NORMAL G/DL
MAGNESIUM SERPL-MCNC: 1.9 MG/DL (ref 1.6–2.3)
MCH RBC QN AUTO: 29.4 PG (ref 26.6–33)
MCHC RBC AUTO-ENTMCNC: 32.8 G/DL (ref 31.5–35.7)
MCV RBC AUTO: 90 FL (ref 79–97)
MICRO URNS: ABNORMAL
MYELOPEROXIDASE AB SER IA-ACNC: <9 U/ML (ref 0–9)
NITRITE UR QL STRIP: NEGATIVE
P-ANCA ATYPICAL TITR SER IF: NORMAL TITER
P-ANCA TITR SER IF: NORMAL TITER
PH UR STRIP: 6 [PH] (ref 5–7.5)
PLATELET # BLD AUTO: 238 X10E3/UL (ref 150–450)
POTASSIUM SERPL-SCNC: 4.4 MMOL/L (ref 3.5–5.2)
PROT SERPL-MCNC: 6.7 G/DL (ref 6–8.5)
PROT UR QL STRIP: ABNORMAL
PROT UR-MCNC: 93.1 MG/DL
PROT/CREAT UR: 580 MG/G CREAT (ref 0–200)
PROTEINASE3 AB SER IA-ACNC: <3.5 U/ML (ref 0–3.5)
RBC # BLD AUTO: 4.49 X10E6/UL (ref 3.77–5.28)
RBC #/AREA URNS HPF: ABNORMAL /HPF (ref 0–2)
RPR SER QL: NON REACTIVE
SL AMB VLDL CHOLESTEROL CALC: 17 MG/DL (ref 5–40)
SODIUM SERPL-SCNC: 140 MMOL/L (ref 134–144)
SP GR UR: 1.02 (ref 1–1.03)
TRIGL SERPL-MCNC: 96 MG/DL (ref 0–149)
UROBILINOGEN UR STRIP-ACNC: 0.2 MG/DL (ref 0.2–1)
WBC # BLD AUTO: 5.9 X10E3/UL (ref 3.4–10.8)
WBC #/AREA URNS HPF: ABNORMAL /HPF (ref 0–5)

## 2022-05-02 ENCOUNTER — TELEPHONE (OUTPATIENT)
Dept: NEPHROLOGY | Facility: CLINIC | Age: 47
End: 2022-05-02

## 2022-05-02 DIAGNOSIS — R31.29 MICROSCOPIC HEMATURIA: Primary | ICD-10-CM

## 2022-05-02 NOTE — TELEPHONE ENCOUNTER
----- Message from Sallie Shah MD sent at 5/2/2022  7:13 AM EDT -----  1  As I discussed with the patient she does need urology consultation for microscopic hematuria although may be related to kidney function we need to make sure there is no other cause please facilitate  2  There is urine protein electrophoresis who is final result was pending can you please obtain that!     Thank you

## 2022-05-03 NOTE — TELEPHONE ENCOUNTER
Spoke with patient about the following, she expressed understanding and thanked us for the call  Number for urology was given so patient can schedule her urology appointment :  1  As I discussed with the patient she does need urology consultation for microscopic hematuria although may be related to kidney function we need to make sure there is no other cause please facilitate      2  There is urine protein electrophoresis who is final result was pending can you please obtain that!

## 2022-05-04 ENCOUNTER — OFFICE VISIT (OUTPATIENT)
Dept: UROLOGY | Facility: CLINIC | Age: 47
End: 2022-05-04
Payer: COMMERCIAL

## 2022-05-04 VITALS
WEIGHT: 179 LBS | HEIGHT: 63 IN | DIASTOLIC BLOOD PRESSURE: 80 MMHG | BODY MASS INDEX: 31.71 KG/M2 | HEART RATE: 68 BPM | SYSTOLIC BLOOD PRESSURE: 120 MMHG

## 2022-05-04 DIAGNOSIS — R31.29 MICROSCOPIC HEMATURIA: Primary | ICD-10-CM

## 2022-05-04 LAB
SL AMB  POCT GLUCOSE, UA: NORMAL
SL AMB LEUKOCYTE ESTERASE,UA: NORMAL
SL AMB POCT BILIRUBIN,UA: NORMAL
SL AMB POCT BLOOD,UA: NORMAL
SL AMB POCT CLARITY,UA: CLEAR
SL AMB POCT COLOR,UA: YELLOW
SL AMB POCT KETONES,UA: NORMAL
SL AMB POCT NITRITE,UA: NORMAL
SL AMB POCT PH,UA: 5
SL AMB POCT SPECIFIC GRAVITY,UA: 1.03
SL AMB POCT URINE PROTEIN: NORMAL
SL AMB POCT UROBILINOGEN: 0.2

## 2022-05-04 PROCEDURE — 1036F TOBACCO NON-USER: CPT | Performed by: UROLOGY

## 2022-05-04 PROCEDURE — 99204 OFFICE O/P NEW MOD 45 MIN: CPT | Performed by: UROLOGY

## 2022-05-04 PROCEDURE — 3008F BODY MASS INDEX DOCD: CPT | Performed by: UROLOGY

## 2022-05-04 PROCEDURE — 81002 URINALYSIS NONAUTO W/O SCOPE: CPT | Performed by: UROLOGY

## 2022-05-04 NOTE — PROGRESS NOTES
Referring Physician: Annemarie Sharp MD  A copy of this note was sent to the referring physician  Diagnoses and all orders for this visit:    Microscopic hematuria  -     POCT urine dip  -     Urinalysis with microscopic            Assessment and plan:       1  Asymptomatic microscopic hematuria  -renal ultrasound negative for any upper tract lesions  -patient without risk factors for urothelial    2  Albuminuria  -undergoing workup with    I recommended flexible cystoscopy for further evaluation  Discussed the risks benefits and alternatives to this diagnostic procedure  Risks include but are not limited to hematuria, pain, urinary tract infection, stricture formation, possible need for hospitalization  Patient verbalized understanding and has elected to proceed  Cystoscopy will be scheduled in the near future  Bi Evangelista MD      Chief Complaint     Microscopic hematuria workup      History of Present Illness     Daljit Lobo is a 40 y o  female referred in consultation by Dr Donte Olivia  This is a very pleasant healthy 36-year-old female who recently was found have microscopic hematuria and albuminuria on occupational screening test   He was evaluated by Dr Donte Olivia of Nephrology recently  Repeat urine testing did confirm microscopic hematuria attending 50 red blood cells present  There were no signs of infection  The patient is asymptomatic from a genitourinary standpoint  He denies any prior urinary tract infections, pelvic pain, bothersome lower urinary tract symptoms  No history of nephrolithiasis  Medical history includes an orthopedic procedure performed as a child but no intra-abdominal surgeries  A renal ultrasound has already been performed  This is negative for hydronephrosis upper tract masses or signs of intrarenal calculi      Detailed Urologic History     - please refer to HPI    Review of Systems     Review of Systems   Constitutional: Negative for activity change and fatigue  HENT: Negative for congestion  Eyes: Negative for visual disturbance  Respiratory: Negative for shortness of breath and wheezing  Cardiovascular: Negative for chest pain and leg swelling  Gastrointestinal: Negative for abdominal pain  Endocrine: Positive for polyuria  Genitourinary: Positive for dysuria  Negative for flank pain, hematuria and urgency  Musculoskeletal: Negative for back pain  Allergic/Immunologic: Negative for immunocompromised state  Neurological: Negative for dizziness and numbness  Psychiatric/Behavioral: Negative for dysphoric mood  All other systems reviewed and are negative  Allergies     No Known Allergies    Physical Exam     Physical Exam  Constitutional:       Appearance: She is well-developed  HENT:      Head: Normocephalic and atraumatic  Eyes:      Pupils: Pupils are equal, round, and reactive to light  Pulmonary:      Effort: Pulmonary effort is normal       Breath sounds: Normal breath sounds  Abdominal:      Palpations: Abdomen is soft  Genitourinary:     Vagina: Normal       Comments: Abdomen is soft nontender nondistended no abdominal surgical incisions are noted  Musculoskeletal:         General: Normal range of motion  Cervical back: Normal range of motion  Skin:     General: Skin is warm  Neurological:      Mental Status: She is alert and oriented to person, place, and time  Vital Signs  Vitals:    05/04/22 0823   BP: 120/80   Pulse: 68   Weight: 81 2 kg (179 lb)   Height: 5' 3" (1 6 m)         Current Medications     No current outpatient medications on file  Active Problems     Patient Active Problem List   Diagnosis    Vitamin D deficiency    Proteinuria    Low serum T4 level    Stage 1 chronic kidney disease    Microscopic hematuria         Past Medical History     History reviewed  No pertinent past medical history        Surgical History     Past Surgical History:   Procedure Laterality Date    HIP SURGERY      Right Hip repair/realignment surgery         Family History     Family History   Problem Relation Age of Onset    Hyperlipidemia Mother     Lung cancer Father          Social History     Social History     Social History     Tobacco Use   Smoking Status Never Smoker   Smokeless Tobacco Never Used         Pertinent Lab Values     Lab Results   Component Value Date    CREATININE 0 94 04/27/2022    CREATININE 0 87 04/27/2022       No results found for: PSA    @RESULTRCNT(1H])@      Pertinent Imaging      - n/a    Portions of the record may have been created with voice recognition software   Occasional wrong word or "sound a like" substitutions may have occurred due to the inherent limitations of voice recognition software   Read the chart carefully and recognize, using context, where substitutions have occurred

## 2022-05-05 LAB
APPEARANCE UR: CLEAR
BACTERIA URNS QL MICRO: ABNORMAL
BILIRUB UR QL STRIP: NEGATIVE
CASTS URNS QL MICRO: ABNORMAL /LPF
COLOR UR: YELLOW
EPI CELLS #/AREA URNS HPF: >10 /HPF (ref 0–10)
GLUCOSE UR QL: NEGATIVE
HGB UR QL STRIP: ABNORMAL
KETONES UR QL STRIP: NEGATIVE
LEUKOCYTE ESTERASE UR QL STRIP: NEGATIVE
MICRO URNS: ABNORMAL
NITRITE UR QL STRIP: NEGATIVE
PH UR STRIP: 5.5 [PH] (ref 5–7.5)
PROT UR QL STRIP: ABNORMAL
RBC #/AREA URNS HPF: ABNORMAL /HPF (ref 0–2)
SP GR UR: 1.02 (ref 1–1.03)
UROBILINOGEN UR STRIP-ACNC: 0.2 MG/DL (ref 0.2–1)
WBC #/AREA URNS HPF: ABNORMAL /HPF (ref 0–5)

## 2022-05-12 ENCOUNTER — VBI (OUTPATIENT)
Dept: ADMINISTRATIVE | Facility: OTHER | Age: 47
End: 2022-05-12

## 2022-06-01 NOTE — PROGRESS NOTES
RENAL FOLLOW UP NOTE: td     ASSESSMENT AND PLAN:  40y o  year old female with a history of mild obesity/vitamin-D deficiency we are asked to see regarding grow albuminuria:        1  CKD stage 1    · Etiology:  Possible glomerular process/vasculitis given microscopic hematuria and proteinuria/micro albuminuria  Of note patient with iritis, no overt uveitis by history however she does use steroid drops occasional oral steroids  ? RUBEN  · Baseline creatinine:1 0  Recommend:  · Workup:  ? Creatinine 0 80 at baseline  ? Electrolytes normal  ? Calcium normal at 8 5/vitamin-D normal at 33 8  ? Normal CBC with a hemoglobin of 12 7  ? Creatinine clearance 123 mL/minute  ? INR normal  ? Lipid profile: /HDL 46/triglycerides 73: I would simply recommend diet and exercise and weight loss as appropriate:  Patient without a strong family history of heart disease and personally has no heart disease or any other vascular disease  Ideally would like it closer to 100 given question of chronic kidney disease  ? Serologies:  ESR 7/RPR nonreactive/hepatitis C normal/IgA negative/SPEP/UPEP negative/DARIUS and double-stranded DNA negative/ANCA negative/C3/C4 normal/ESR and CRP both normal/ASO negative/RPR negative  ? UA with microscopic:  2+ proteinuria/2+ heme/3-10 RBCs/6-10 WBCs  ? Urine protein creatinine ratio 0 58 g low 1 g:  · Repeat urine studies pending    ? Renal ultrasound with PVR:Normal: Normal         · Treatment:  ? Treat hypertension, please see below for recommendations  ? Treat dyslipidemia  ? Avoid nephrotoxic agents such as NSAIDs, and proton pump inhibitors as able; patient counseled as such  ? Good overall health recommendations including weight loss as appropriate, isotonic exercise as able, and avoidance of salt; patient counseled as such  ? Seen by Dr Ariela Bowser who will perform a cystoscopy to complete microscopic hematuria urologic workup  ?  I would perform close observation and if her proteinuria approaches 1 g I would definitely pursue kidney biopsy at this time  Patient potentially has IgA nephropathy  ? Needs a repeat UA with microscopic as well as urine protein creatinine ratio at this time     Further workup and treatment recommendations will be forthcoming depending upon the above results and course     2  Hypertension:  · Goal:  Less than 130/80-possibly less 125/75 given CKD; may be lower based on proteinuria    Current blood pressure readings:  A m :  107/69 heart rate of 68 on average  P m :  None at this      · Push nonmedical regimen as outlined above  · Potential use of an ACE-inhibitor/ARB given proteinuria  · Medication changes today:   · Doing well no changes at this time pending proteinuria     3  Other problems:  · Mild Obesity  · Vitamin-D deficiency   33 8 from April 27, 2022 continue current vitamin-D supplement      PATIENT INSTRUCTIONS:    Patient Instructions   1  Medication changes today:   No medication changes today pending follow-up urine studies    2  Please go for your urine tests at this time in the morning     Please take 1 week a blood pressure readings  prior to next appointment     AS FOLLOWS  MORNING AND EVENING, SITTING AND STANDING as follows:  · TAKE THE MORNING READINGS BEFORE ANY MEDICATIONS AND WHEN YOU ARE RELAXED FOR SEVERAL MINUTES  · TAKE THE EVENING READINGS:  BETWEEN 7-10 P M ; PRIOR TO ANY MEDICATIONS; AT LEAST IN OUR  FROM DINNER; AND CERTAINLY AFTER RELAXING FOR A FEW MINUTES  · PLEASE INCLUDE HEART RATE WITH YOUR BLOOD PRESSURE READINGS  · When taking standing readings, keep your arm supported at heart level and not dangling  · Make sure you are sitting with your back supported and feet on the ground and do not cross your legs or feet  · Make sure you have not taken any coffee or caffeine products or exercised or smoke cigarettes at least 30 minutes before taking your blood pressure  Then please mail these readings into the office    3  Follow-up in 4  months:  But please go for fasting lab work in 3 months   Please bring in 1 week a blood pressure readings morning evening, sitting and standing is outlined above   PLEASE BRING AN YOUR BLOOD PRESSURE MACHINE TO CORRELATE WITH THE OFFICE MACHINE AT THIS NEXT SCHEDULED VISIT        4  General instructions:   AVOID SALT BUT NOT ADDING AN READING LABELS TO MAKE SURE THERE IS LOW-SALT IN THE FOOD THAT YOU ARE EATING  o Goal is less than 2 g of sodium intake or less than 5 g of sodium chloride intake per day     Avoid nonsteroidal anti-inflammatory drugs such as Naprosyn, ibuprofen, Aleve, Advil, Celebrex, Meloxicam (Mobic) etc   You can use Tylenol as needed if you do not have any liver condition to be concerned about     Avoid medications such as Sudafed or decongestants and antihistamines that contained the D component which is the decongestant  You can take antihistamines without the decongestant or D component   Try to avoid medications such as pantoprazole or  Protonix/Nexium or Esomeprazole)/Prilosec or omeprazole/Prevacid or lansoprazole/AcipHex or Rabeprazole  If you are able to, use Pepcid as this is safer for your kidneys   Try to exercise at least 30 minutes 3 days a week to begin with with an ultimate goal of 5 days a week for at least 30 minutes     Try to lose 5-10 lb by your next visit     Please do not drink more than 2 glasses of alcohol/wine on a daily basis as this may contribute to your high blood pressure  Subjective: There has been no hospitalizations or acute illnesses since last visit  The patient overall is feeling well  No fevers, chills, or cough or colds    Good appetite and good energy  No hematuria, dysuria, voiding symptoms or foamy urine  No gastrointestinal symptoms  No cardiovascular symptoms including swelling of the legs  No headaches, dizziness or lightheadedness  Blood pressure medications:   None at this time      ROS:  See HPI, otherwise review of systems as completely reviewed with the patient are negative    History reviewed  No pertinent past medical history  Past Surgical History:   Procedure Laterality Date    HIP SURGERY      Right Hip repair/realignment surgery     Family History   Problem Relation Age of Onset    Hyperlipidemia Mother     Lung cancer Father       reports that she has never smoked  She has never used smokeless tobacco  She reports current alcohol use  She reports that she does not use drugs  I COMPLETELY REVIEWED THE PAST MEDICAL HISTORY/PAST SURGICAL HISTORY/SOCIAL HISTORY/FAMILY HISTORY/AND MEDICATIONS  AND UPDATED ALL    Objective:     Vitals:   BP sitting on left:  120/68 with a heart rate of 60 and regular  BP standing on left:  112/70 with a heart rate of 60 and regular    Weight (last 2 days)     Date/Time Weight    06/08/22 0857 80 7 (178)        Wt Readings from Last 3 Encounters:   06/08/22 80 7 kg (178 lb)   05/04/22 81 2 kg (179 lb)   04/19/22 81 6 kg (180 lb)       Body mass index is 31 53 kg/m²  Physical Exam: General:  No acute distress  Skin:  No acute rash  Eyes:  No scleral icterus, noninjected, no discharge from eyes  ENT:  Moist mucous membranes  Neck:  Supple, no jugular venous distention, trachea is midline, no lymphadenopathy and no thyromegaly  Back   No CVAT  Chest:  Clear to auscultation and percussion, good respiratory effort  CVS:  Regular rate and rhythm without a rub, or gallops or murmurs  Abdomen:  Soft and nontender with normal bowel sounds  Extremities:  No cyanosis and no edema, no arthritic changes, normal range of motion  Neuro:  Grossly intact  Psych:  Alert, oriented x3 and appropriate      Medications:  No current outpatient medications on file  Lab, Imaging and other studies: I have personally reviewed pertinent labs    Laboratory Results:  Results for orders placed or performed in visit on 05/04/22   Urinalysis with microscopic   Result Value Ref Range Specific Gravity 1 025 1 005 - 1 030    Ph 5 5 5 0 - 7 5    Color UA Yellow Yellow    Urine Appearance Clear Clear    Leukocyte Esterase Negative Negative    Protein 2+ (A) Negative/Trace    Glucose, 24 HR Urine Negative Negative    Ketone, Urine Negative Negative    Blood, Urine 2+ (A) Negative    Bilirubin, Urine Negative Negative    Urobilinogen Urine 0 2 0 2 - 1 0 mg/dL    SL AMB NITRITES URINE, QUAL  Negative Negative    Microscopic Examination See below:    Microscopic Examination   Result Value Ref Range    SL AMB WBC, URINE 6-10 (A) 0 - 5 /hpf    RBC, Urine 3-10 (A) 0 - 2 /hpf    Epithelial Cells (non renal) >10 (A) 0 - 10 /hpf    Casts None seen None seen /lpf    Bacteria, Urine Few None seen/Few   POCT urine dip   Result Value Ref Range    LEUKOCYTE ESTERASE,UA -     NITRITE,UA -     SL AMB POCT UROBILINOGEN 0 2     POCT URINE PROTEIN 100++      PH,UA 5 0     BLOOD,UA large     SPECIFIC GRAVITY,UA 1 030     KETONES,UA -     BILIRUBIN,UA -     GLUCOSE, UA -      COLOR,UA yellow     CLARITY,UA clear        Results from last 7 days   Lab Units 06/06/22  0709   WHITE BLOOD CELL COUNT  x10E3/uL 6 0   HEMOGLOBIN  g/dL 12 7   HEMATOCRIT  % 37 4   PLATELETS  [id number]/   POTASSIUM mmol/L 4 2   CHLORIDE mmol/L 107*   CO2 mmol/L 21   BUN mg/dL 18   CREATININE mg/dL 0 80   MAGNESIUM mg/dL 1 9         Radiology review:   chest X-ray    Ultrasound      Portions of the record may have been created with voice recognition software  Occasional wrong word or "sound a like" substitutions may have occurred due to the inherent limitations of voice recognition software  Read the chart carefully and recognize, using context, where substitutions have occurred

## 2022-06-02 ENCOUNTER — TELEPHONE (OUTPATIENT)
Dept: NEPHROLOGY | Facility: CLINIC | Age: 47
End: 2022-06-02

## 2022-06-07 LAB
ALBUMIN SERPL-MCNC: 4 G/DL (ref 3.8–4.8)
ALBUMIN/GLOB SERPL: 1.8 {RATIO} (ref 1.2–2.2)
ALP SERPL-CCNC: 85 IU/L (ref 44–121)
ALT SERPL-CCNC: 10 IU/L (ref 0–32)
AST SERPL-CCNC: 14 IU/L (ref 0–40)
BILIRUB SERPL-MCNC: 0.4 MG/DL (ref 0–1.2)
BUN SERPL-MCNC: 18 MG/DL (ref 6–24)
BUN/CREAT SERPL: 23 (ref 9–23)
CALCIUM SERPL-MCNC: 8.5 MG/DL (ref 8.7–10.2)
CHLORIDE SERPL-SCNC: 107 MMOL/L (ref 96–106)
CHOLEST SERPL-MCNC: 182 MG/DL (ref 100–199)
CK SERPL-CCNC: 103 U/L (ref 32–182)
CO2 SERPL-SCNC: 21 MMOL/L (ref 20–29)
CREAT SERPL-MCNC: 0.8 MG/DL (ref 0.57–1)
EGFR: 93 ML/MIN/1.73
ERYTHROCYTE [DISTWIDTH] IN BLOOD BY AUTOMATED COUNT: 12.8 % (ref 11.7–15.4)
GLOBULIN SER-MCNC: 2.2 G/DL (ref 1.5–4.5)
GLUCOSE SERPL-MCNC: 96 MG/DL (ref 65–99)
HCT VFR BLD AUTO: 37.4 % (ref 34–46.6)
HDLC SERPL-MCNC: 48 MG/DL
HGB BLD-MCNC: 12.7 G/DL (ref 11.1–15.9)
LDLC SERPL CALC-MCNC: 120 MG/DL (ref 0–99)
LDLC/HDLC SERPL: 2.5 RATIO (ref 0–3.2)
MAGNESIUM SERPL-MCNC: 1.9 MG/DL (ref 1.6–2.3)
MCH RBC QN AUTO: 30.3 PG (ref 26.6–33)
MCHC RBC AUTO-ENTMCNC: 34 G/DL (ref 31.5–35.7)
MCV RBC AUTO: 89 FL (ref 79–97)
PLATELET # BLD AUTO: 237 X10E3/UL (ref 150–450)
POTASSIUM SERPL-SCNC: 4.2 MMOL/L (ref 3.5–5.2)
PROT SERPL-MCNC: 6.2 G/DL (ref 6–8.5)
RBC # BLD AUTO: 4.19 X10E6/UL (ref 3.77–5.28)
SL AMB VLDL CHOLESTEROL CALC: 14 MG/DL (ref 5–40)
SODIUM SERPL-SCNC: 140 MMOL/L (ref 134–144)
TRIGL SERPL-MCNC: 73 MG/DL (ref 0–149)
WBC # BLD AUTO: 6 X10E3/UL (ref 3.4–10.8)

## 2022-06-08 ENCOUNTER — OFFICE VISIT (OUTPATIENT)
Dept: NEPHROLOGY | Facility: CLINIC | Age: 47
End: 2022-06-08
Payer: COMMERCIAL

## 2022-06-08 VITALS — WEIGHT: 178 LBS | BODY MASS INDEX: 31.54 KG/M2 | HEIGHT: 63 IN

## 2022-06-08 DIAGNOSIS — R80.1 PERSISTENT PROTEINURIA: ICD-10-CM

## 2022-06-08 DIAGNOSIS — R31.29 MICROSCOPIC HEMATURIA: ICD-10-CM

## 2022-06-08 DIAGNOSIS — N18.1 STAGE 1 CHRONIC KIDNEY DISEASE: Primary | ICD-10-CM

## 2022-06-08 PROCEDURE — 1036F TOBACCO NON-USER: CPT | Performed by: INTERNAL MEDICINE

## 2022-06-08 PROCEDURE — 99214 OFFICE O/P EST MOD 30 MIN: CPT | Performed by: INTERNAL MEDICINE

## 2022-06-08 NOTE — PATIENT INSTRUCTIONS
1  Medication changes today:  No medication changes today pending follow-up urine studies    2  Please go for your urine tests at this time in the morning     Please take 1 week a blood pressure readings  prior to next appointment     AS FOLLOWS  MORNING AND EVENING, SITTING AND STANDING as follows:  TAKE THE MORNING READINGS BEFORE ANY MEDICATIONS AND WHEN YOU ARE RELAXED FOR SEVERAL MINUTES  TAKE THE EVENING READINGS:  BETWEEN 7-10 P M ; PRIOR TO ANY MEDICATIONS; AT LEAST IN OUR  FROM DINNER; AND CERTAINLY AFTER RELAXING FOR A FEW MINUTES  PLEASE INCLUDE HEART RATE WITH YOUR BLOOD PRESSURE READINGS  When taking standing readings, keep your arm supported at heart level and not dangling  Make sure you are sitting with your back supported and feet on the ground and do not cross your legs or feet  Make sure you have not taken any coffee or caffeine products or exercised or smoke cigarettes at least 30 minutes before taking your blood pressure  Then please mail these readings into the office    3  Follow-up in 4  months:  But please go for fasting lab work in 3 months  Please bring in 1 week a blood pressure readings morning evening, sitting and standing is outlined above  PLEASE BRING AN YOUR BLOOD PRESSURE MACHINE TO CORRELATE WITH THE OFFICE MACHINE AT THIS NEXT SCHEDULED VISIT        4  General instructions:  AVOID SALT BUT NOT ADDING AN READING LABELS TO MAKE SURE THERE IS LOW-SALT IN THE FOOD THAT YOU ARE EATING  Goal is less than 2 g of sodium intake or less than 5 g of sodium chloride intake per day    Avoid nonsteroidal anti-inflammatory drugs such as Naprosyn, ibuprofen, Aleve, Advil, Celebrex, Meloxicam (Mobic) etc   You can use Tylenol as needed if you do not have any liver condition to be concerned about    Avoid medications such as Sudafed or decongestants and antihistamines that contained the D component which is the decongestant    You can take antihistamines without the decongestant or D component  Try to avoid medications such as pantoprazole or  Protonix/Nexium or Esomeprazole)/Prilosec or omeprazole/Prevacid or lansoprazole/AcipHex or Rabeprazole  If you are able to, use Pepcid as this is safer for your kidneys  Try to exercise at least 30 minutes 3 days a week to begin with with an ultimate goal of 5 days a week for at least 30 minutes    Try to lose 5-10 lb by your next visit    Please do not drink more than 2 glasses of alcohol/wine on a daily basis as this may contribute to your high blood pressure  Cholesterol and Your Health   AMBULATORY CARE:   Cholesterol  is a waxy, fat-like substance  Your body uses cholesterol to make hormones and new cells, and to protect nerves  Cholesterol is made by your body  It also comes from certain foods you eat, such as meat and dairy products  Your healthcare provider can help you set goals for your cholesterol levels  He or she can help you create a plan to meet your goals  Cholesterol level goals: Your cholesterol level goals depend on your risk for heart disease, your age, and your other health conditions  The following are general guidelines: Total cholesterol  includes low-density lipoprotein (LDL), high-density lipoprotein (HDL), and triglyceride levels  The total cholesterol level should be lower than 200 mg/dL and is best at about 150 mg/dL  LDL cholesterol  is called bad cholesterol  because it forms plaque in your arteries  As plaque builds up, your arteries become narrow, and less blood flows through  When plaque decreases blood flow to your heart, you may have chest pain  If plaque completely blocks an artery that brings blood to your heart, you may have a heart attack  Plaque can break off and form blood clots  Blood clots may block arteries in your brain and cause a stroke  The level should be less than 130 mg/dL and is best at about 100 mg/dL           HDL cholesterol  is called good cholesterol  because it helps remove LDL cholesterol from your arteries  It does this by attaching to LDL cholesterol and carrying it to your liver  Your liver breaks down LDL cholesterol so your body can get rid of it  High levels of HDL cholesterol can help prevent a heart attack and stroke  Low levels of HDL cholesterol can increase your risk for heart disease, heart attack, and stroke  The level should be 60 mg/dL or higher  Triglycerides  are a type of fat that store energy from foods you eat  High levels of triglycerides also cause plaque buildup  This can increase your risk for a heart attack or stroke  If your triglyceride level is high, your LDL cholesterol level may also be high  The level should be less than 150 mg/dL  Any of the following can increase your risk for high cholesterol:   Smoking cigarettes    Being overweight or obese, or not getting enough exercise    Drinking large amounts of alcohol    A medical condition such as hypertension (high blood pressure) or diabetes    Certain genes passed from your parents to you    Age older than 65 years    What you need to know about having your cholesterol levels checked: Adults 21to 39years of age should have their cholesterol levels checked every 4 to 6 years  Adults 45 years or older should have their cholesterol checked every 1 to 2 years  You may need your cholesterol checked more often, or at a younger age, if you have risk factors for heart disease  You may also need to have your cholesterol checked more often if you have other health conditions, such as diabetes  Blood tests are used to check cholesterol levels  Blood tests measure your levels of triglycerides, LDL cholesterol, and HDL cholesterol  How healthy fats affect your cholesterol levels:  Healthy fats, also called unsaturated fats, help lower LDL cholesterol and triglyceride levels   Healthy fats include the following:  Monounsaturated fats  are found in foods such as olive oil, canola oil, avocado, nuts, and olives  Polyunsaturated fats,  such as omega 3 fats, are found in fish, such as salmon, trout, and tuna  They can also be found in plant foods such as flaxseed, walnuts, and soybeans  How unhealthy fats affect your cholesterol levels:  Unhealthy fats increase LDL cholesterol and triglyceride levels  They are found in foods high in cholesterol, saturated fat, and trans fat:  Cholesterol  is found in eggs, dairy, and meat  Saturated fat  is found in butter, cheese, ice cream, whole milk, and coconut oil  Saturated fat is also found in meat, such as sausage, hot dogs, and bologna  Trans fat  is found in liquid oils and is used in fried and baked foods  Foods that contain trans fats include chips, crackers, muffins, sweet rolls, microwave popcorn, and cookies  Treatment  for high cholesterol will also decrease your risk of heart disease, heart attack, and stroke  Treatment may include any of the following:  Lifestyle changes  may include food, exercise, weight loss, and quitting smoking  You may also need to decrease the amount of alcohol you drink  Your healthcare provider will want you to start with lifestyle changes  Other treatment may be added if lifestyle changes are not enough  Your healthcare provider may recommend you work with a team to manage hyperlipidemia  The team may include medical experts such as a dietitian, an exercise or physical therapist, and a behavior therapist  Your family members may be included in helping you create lifestyle changes  Medicines  may be given to lower your LDL cholesterol, triglyceride levels, or total cholesterol level  You may need medicines to lower your cholesterol if any of the following is true:    You have a history of stroke, TIA, unstable angina, or a heart attack  Your LDL cholesterol level is 190 mg/dL or higher      You are age 36 to 76 years, have diabetes or heart disease risk factors, and your LDL cholesterol is 70 mg/dL or higher  Supplements  include fish oil, red yeast rice, and garlic  Fish oil may help lower your triglyceride and LDL cholesterol levels  It may also increase your HDL cholesterol level  Red yeast rice may help decrease your total cholesterol level and LDL cholesterol level  Garlic may help lower your total cholesterol level  Do not take any supplements without talking to your healthcare provider  Food changes you can make to lower your cholesterol levels:  A dietitian can help you create a healthy eating plan  He or she can show you how to read food labels and choose foods low in saturated fat, trans fats, and cholesterol  Decrease the total amount of fat you eat  Choose lean meats, fat-free or 1% fat milk, and low-fat dairy products, such as yogurt and cheese  Try to limit or avoid red meats  Limit or do not eat fried foods or baked goods, such as cookies  Replace unhealthy fats with healthy fats  Cook foods in olive oil or canola oil  Choose soft margarines that are low in saturated fat and trans fat  Seeds, nuts, and avocados are other examples of healthy fats  Eat foods with omega-3 fats  Examples include salmon, tuna, mackerel, walnuts, and flaxseed  Eat fish 2 times per week  Pregnant women should not eat fish that have high levels of mercury, such as shark, swordfish, and alfonso mackerel  Increase the amount of high-fiber foods you eat  High-fiber foods can help lower your LDL cholesterol  Aim to get between 20 and 30 grams of fiber each day  Fruits and vegetables are high in fiber  Eat at least 5 servings each day  Other high-fiber foods are whole-grain or whole-wheat breads, pastas, or cereals, and brown rice  Eat 3 ounces of whole-grain foods each day  Increase fiber slowly  You may have abdominal discomfort, bloating, and gas if you add fiber to your diet too quickly  Eat healthy protein foods    Examples include low-fat dairy products, skinless chicken and turkey, fish, and nuts  Limit foods and drinks that are high in sugar  Your dietitian or healthcare provider can help you create daily limits for high-sugar foods and drinks  The limit may be lower if you have diabetes or another health condition  Limits can also help you lose weight if needed  Lifestyle changes you can make to lower your cholesterol levels:   Maintain a healthy weight  Ask your healthcare provider what a healthy weight is for you  Ask him or her to help you create a weight loss plan if needed  Weight loss can decrease your total cholesterol and triglyceride levels  Weight loss may also help keep your blood pressure at a healthy level  Be physically active throughout the day  Physical activity, such as exercise, can help lower your total cholesterol level and maintain a healthy weight  Physical activity can also help increase your HDL cholesterol level  Work with your healthcare provider to create an program that is right for you  Get at least 30 to 40 minutes of moderate physical activity most days of the week  Examples of exercise include brisk walking, swimming, or biking  Also include strength training at least 2 times each week  Your healthcare providers can help you create a physical activity plan  Do not smoke  Nicotine and other chemicals in cigarettes and cigars can raise your cholesterol levels  Ask your healthcare provider for information if you currently smoke and need help to quit  E-cigarettes or smokeless tobacco still contain nicotine  Talk to your healthcare provider before you use these products  Limit or do not drink alcohol  Alcohol can increase your triglyceride levels  Ask your healthcare provider before you drink alcohol  Ask how much is okay for you to drink in 24 hours or 1 week  Follow up with your doctor as directed:  Write down your questions so you remember to ask them during your visits    © Copyright [employer] 2022 Information is for End User's use only and may not be sold, redistributed or otherwise used for commercial purposes  All illustrations and images included in CareNotes® are the copyrighted property of [employer]  or [employer]  The above information is an  only  It is not intended as medical advice for individual conditions or treatments  Talk to your doctor, nurse or pharmacist before following any medical regimen to see if it is safe and effective for you

## 2022-06-08 NOTE — LETTER
June 8, 2022     [facility] [address] [address]    Patient: Carlin Johnson   YOB: 1975   Date of Visit: 6/8/2022       Dear Dr Herbert Post:    Thank you for referring Carlin Johnson to me for evaluation  Below are my notes for this consultation  If you have questions, please do not hesitate to call me  I look forward to following your patient along with you  Sincerely,        Bright Garza MD        CC: No Recipients  Bright Garza MD  6/8/2022  9:28 AM  Sign when Signing Visit  RENAL FOLLOW UP NOTE: td     ASSESSMENT AND PLAN:  40y o  year old female with a history of mild obesity/vitamin-D deficiency we are asked to see regarding grow albuminuria:        1  CKD stage 1    · Etiology:  Possible glomerular process/vasculitis given microscopic hematuria and proteinuria/micro albuminuria  Of note patient with iritis, no overt uveitis by history however she does use steroid drops occasional oral steroids  ? RUBEN  · Baseline creatinine:1 0  Recommend:  · Workup:  ? Creatinine 0 80 at baseline  ? Electrolytes normal  ? Calcium normal at 8 5/vitamin-D normal at 33 8  ? Normal CBC with a hemoglobin of 12 7  ? Creatinine clearance 123 mL/minute  ? INR normal  ? Lipid profile: /HDL 46/triglycerides 73: I would simply recommend diet and exercise and weight loss as appropriate:  Patient without a strong family history of heart disease and personally has no heart disease or any other vascular disease  Ideally would like it closer to 100 given question of chronic kidney disease  ? Serologies:  ESR 7/RPR nonreactive/hepatitis C normal/IgA negative/SPEP/UPEP negative/DARIUS and double-stranded DNA negative/ANCA negative/C3/C4 normal/ESR and CRP both normal/ASO negative/RPR negative  ? UA with microscopic:  2+ proteinuria/2+ heme/3-10 RBCs/6-10 WBCs  ? Urine protein creatinine ratio 0 58 g low 1 g:  · Repeat urine studies pending    ?  Renal ultrasound with PVR:Normal: Normal         · Treatment:  ? Treat hypertension, please see below for recommendations  ? Treat dyslipidemia  ? Avoid nephrotoxic agents such as NSAIDs, and proton pump inhibitors as able; patient counseled as such  ? Good overall health recommendations including weight loss as appropriate, isotonic exercise as able, and avoidance of salt; patient counseled as such  ? Seen by Dr Chela Yoon who will perform a cystoscopy to complete microscopic hematuria urologic workup  ? I would perform close observation and if her proteinuria approaches 1 g I would definitely pursue kidney biopsy at this time  Patient potentially has IgA nephropathy  ? Needs a repeat UA with microscopic as well as urine protein creatinine ratio at this time     Further workup and treatment recommendations will be forthcoming depending upon the above results and course     2  Hypertension:  · Goal:  Less than 130/80-possibly less 125/75 given CKD; may be lower based on proteinuria    Current blood pressure readings:  A m :  107/69 heart rate of 68 on average  P m :  None at this      · Push nonmedical regimen as outlined above  · Potential use of an ACE-inhibitor/ARB given proteinuria  · Medication changes today:   · Doing well no changes at this time pending proteinuria     3  Other problems:  · Mild Obesity  · Vitamin-D deficiency   33 8 from April 27, 2022 continue current vitamin-D supplement      PATIENT INSTRUCTIONS:    Patient Instructions   1  Medication changes today:   No medication changes today pending follow-up urine studies    2   Please go for your urine tests at this time in the morning     Please take 1 week a blood pressure readings  prior to next appointment     AS FOLLOWS  MORNING AND EVENING, SITTING AND STANDING as follows:  · TAKE THE MORNING READINGS BEFORE ANY MEDICATIONS AND WHEN YOU ARE RELAXED FOR SEVERAL MINUTES  · TAKE THE EVENING READINGS:  BETWEEN 7-10 P M ; PRIOR TO ANY MEDICATIONS; AT LEAST IN OUR  FROM DINNER; AND CERTAINLY AFTER RELAXING FOR A FEW MINUTES  · PLEASE INCLUDE HEART RATE WITH YOUR BLOOD PRESSURE READINGS  · When taking standing readings, keep your arm supported at heart level and not dangling  · Make sure you are sitting with your back supported and feet on the ground and do not cross your legs or feet  · Make sure you have not taken any coffee or caffeine products or exercised or smoke cigarettes at least 30 minutes before taking your blood pressure  Then please mail these readings into the office    3  Follow-up in 4  months:  But please go for fasting lab work in 3 months   Please bring in 1 week a blood pressure readings morning evening, sitting and standing is outlined above   PLEASE BRING AN YOUR BLOOD PRESSURE MACHINE TO CORRELATE WITH THE OFFICE MACHINE AT THIS NEXT SCHEDULED VISIT        4  General instructions:   AVOID SALT BUT NOT ADDING AN READING LABELS TO MAKE SURE THERE IS LOW-SALT IN THE FOOD THAT YOU ARE EATING  o Goal is less than 2 g of sodium intake or less than 5 g of sodium chloride intake per day     Avoid nonsteroidal anti-inflammatory drugs such as Naprosyn, ibuprofen, Aleve, Advil, Celebrex, Meloxicam (Mobic) etc   You can use Tylenol as needed if you do not have any liver condition to be concerned about     Avoid medications such as Sudafed or decongestants and antihistamines that contained the D component which is the decongestant  You can take antihistamines without the decongestant or D component   Try to avoid medications such as pantoprazole or  Protonix/Nexium or Esomeprazole)/Prilosec or omeprazole/Prevacid or lansoprazole/AcipHex or Rabeprazole  If you are able to, use Pepcid as this is safer for your kidneys       Try to exercise at least 30 minutes 3 days a week to begin with with an ultimate goal of 5 days a week for at least 30 minutes     Try to lose 5-10 lb by your next visit     Please do not drink more than 2 glasses of alcohol/wine on a daily basis as this may contribute to your high blood pressure  Subjective: There has been no hospitalizations or acute illnesses since last visit  The patient overall is feeling well  No fevers, chills, or cough or colds  Good appetite and good energy  No hematuria, dysuria, voiding symptoms or foamy urine  No gastrointestinal symptoms  No cardiovascular symptoms including swelling of the legs  No headaches, dizziness or lightheadedness  Blood pressure medications:   None at this time      ROS:  See HPI, otherwise review of systems as completely reviewed with the patient are negative    History reviewed  No pertinent past medical history  Past Surgical History:   Procedure Laterality Date    HIP SURGERY      Right Hip repair/realignment surgery     Family History   Problem Relation Age of Onset    Hyperlipidemia Mother     Lung cancer Father       reports that she has never smoked  She has never used smokeless tobacco  She reports current alcohol use  She reports that she does not use drugs  I COMPLETELY REVIEWED THE PAST MEDICAL HISTORY/PAST SURGICAL HISTORY/SOCIAL HISTORY/FAMILY HISTORY/AND MEDICATIONS  AND UPDATED ALL    Objective:     Vitals:   BP sitting on left:  120/68 with a heart rate of 60 and regular  BP standing on left:  112/70 with a heart rate of 60 and regular    Weight (last 2 days)     Date/Time Weight    06/08/22 0857 80 7 (178)        Wt Readings from Last 3 Encounters:   06/08/22 80 7 kg (178 lb)   05/04/22 81 2 kg (179 lb)   04/19/22 81 6 kg (180 lb)       Body mass index is 31 53 kg/m²      Physical Exam: General:  No acute distress  Skin:  No acute rash  Eyes:  No scleral icterus, noninjected, no discharge from eyes  ENT:  Moist mucous membranes  Neck:  Supple, no jugular venous distention, trachea is midline, no lymphadenopathy and no thyromegaly  Back   No CVAT  Chest:  Clear to auscultation and percussion, good respiratory effort  CVS:  Regular rate and rhythm without a rub, or gallops or murmurs  Abdomen:  Soft and nontender with normal bowel sounds  Extremities:  No cyanosis and no edema, no arthritic changes, normal range of motion  Neuro:  Grossly intact  Psych:  Alert, oriented x3 and appropriate      Medications:  No current outpatient medications on file  Lab, Imaging and other studies: I have personally reviewed pertinent labs  Laboratory Results:  Results for orders placed or performed in visit on 05/04/22   Urinalysis with microscopic   Result Value Ref Range    Specific Gravity 1 025 1 005 - 1 030    Ph 5 5 5 0 - 7 5    Color UA Yellow Yellow    Urine Appearance Clear Clear    Leukocyte Esterase Negative Negative    Protein 2+ (A) Negative/Trace    Glucose, 24 HR Urine Negative Negative    Ketone, Urine Negative Negative    Blood, Urine 2+ (A) Negative    Bilirubin, Urine Negative Negative    Urobilinogen Urine 0 2 0 2 - 1 0 mg/dL    SL AMB NITRITES URINE, QUAL   Negative Negative    Microscopic Examination See below:    Microscopic Examination   Result Value Ref Range    SL AMB WBC, URINE 6-10 (A) 0 - 5 /hpf    RBC, Urine 3-10 (A) 0 - 2 /hpf    Epithelial Cells (non renal) >10 (A) 0 - 10 /hpf    Casts None seen None seen /lpf    Bacteria, Urine Few None seen/Few   POCT urine dip   Result Value Ref Range    LEUKOCYTE ESTERASE,UA -     NITRITE,UA -     SL AMB POCT UROBILINOGEN 0 2     POCT URINE PROTEIN 100++      PH,UA 5 0     BLOOD,UA large     SPECIFIC GRAVITY,UA 1 030     KETONES,UA -     BILIRUBIN,UA -     GLUCOSE, UA -      COLOR,UA yellow     CLARITY,UA clear        Results from last 7 days   Lab Units 06/06/22  0709   WHITE BLOOD CELL COUNT  x10E3/uL 6 0   HEMOGLOBIN  g/dL 12 7   HEMATOCRIT  % 37 4   PLATELETS  [id number]/   POTASSIUM mmol/L 4 2   CHLORIDE mmol/L 107*   CO2 mmol/L 21   BUN mg/dL 18   CREATININE mg/dL 0 80   MAGNESIUM mg/dL 1 9         Radiology review:   chest X-ray    Ultrasound      Portions of the record may have been created with voice recognition software  Occasional wrong word or "sound a like" substitutions may have occurred due to the inherent limitations of voice recognition software  Read the chart carefully and recognize, using context, where substitutions have occurred

## 2022-06-13 LAB
APPEARANCE UR: CLEAR
BACTERIA URNS QL MICRO: ABNORMAL
BILIRUB UR QL STRIP: NEGATIVE
CASTS URNS QL MICRO: ABNORMAL /LPF
COLOR UR: YELLOW
CREAT UR-MCNC: 119.1 MG/DL
EPI CELLS #/AREA URNS HPF: ABNORMAL /HPF (ref 0–10)
GLUCOSE UR QL: NEGATIVE
HGB UR QL STRIP: ABNORMAL
KETONES UR QL STRIP: NEGATIVE
LEUKOCYTE ESTERASE UR QL STRIP: NEGATIVE
MICRO URNS: ABNORMAL
NITRITE UR QL STRIP: NEGATIVE
PH UR STRIP: 7.5 [PH] (ref 5–7.5)
PROT UR QL STRIP: ABNORMAL
PROT UR-MCNC: 57.2 MG/DL
PROT/CREAT UR: 480 MG/G CREAT (ref 0–200)
RBC #/AREA URNS HPF: ABNORMAL /HPF (ref 0–2)
SP GR UR: 1.02 (ref 1–1.03)
UROBILINOGEN UR STRIP-ACNC: 0.2 MG/DL (ref 0.2–1)
WBC #/AREA URNS HPF: ABNORMAL /HPF (ref 0–5)

## 2022-06-14 ENCOUNTER — TELEPHONE (OUTPATIENT)
Dept: NEPHROLOGY | Facility: CLINIC | Age: 47
End: 2022-06-14

## 2022-06-14 NOTE — TELEPHONE ENCOUNTER
----- Message from Radha Cox MD sent at 6/14/2022  7:32 AM EDT -----  Proteinuria still at goal at 480 mg and stable  Still persistent microscopic hematuria unchanged  For now continue to observe closely  Follow up with Dr Dilma Hurtado from Urology

## 2022-06-15 NOTE — TELEPHONE ENCOUNTER
Spoke with patient about the following, she expressed understanding and thanked us for the call:    Proteinuria still at goal at 480 mg and stable  Still persistent microscopic hematuria unchanged  For now continue to observe closely  Follow up with Dr Opal Manzano from Urology

## 2022-06-30 ENCOUNTER — HOSPITAL ENCOUNTER (OUTPATIENT)
Dept: RADIOLOGY | Facility: HOSPITAL | Age: 47
Discharge: HOME | End: 2022-06-30
Payer: COMMERCIAL

## 2022-06-30 ENCOUNTER — OFFICE VISIT (OUTPATIENT)
Dept: OBGYN CLINIC | Facility: CLINIC | Age: 47
End: 2022-06-30
Payer: COMMERCIAL

## 2022-06-30 VITALS — WEIGHT: 179.8 LBS | HEIGHT: 63 IN | BODY MASS INDEX: 31.86 KG/M2 | HEART RATE: 83 BPM | OXYGEN SATURATION: 98 %

## 2022-06-30 DIAGNOSIS — S86.111A STRAIN OF RIGHT GASTROCNEMIUS MUSCLE, INITIAL ENCOUNTER: ICD-10-CM

## 2022-06-30 DIAGNOSIS — M79.604 RIGHT LEG PAIN: ICD-10-CM

## 2022-06-30 DIAGNOSIS — M79.604 RIGHT LEG PAIN: Primary | ICD-10-CM

## 2022-06-30 PROCEDURE — 73590 X-RAY EXAM OF LOWER LEG: CPT

## 2022-06-30 PROCEDURE — 3008F BODY MASS INDEX DOCD: CPT | Performed by: INTERNAL MEDICINE

## 2022-06-30 PROCEDURE — 99203 OFFICE O/P NEW LOW 30 MIN: CPT | Performed by: PHYSICIAN ASSISTANT

## 2022-06-30 NOTE — PATIENT INSTRUCTIONS
R  I C E  Treatment   WHAT YOU NEED TO KNOW:   What is R I C E  treatment?  R I C E  treatment is a 5-step process used to decrease swelling and pain caused by an injury  R I C E  stands for protect, rest, ice, compress, and elevate  Start R I C E  within 24 to 48 hours of an injury  How do I use R I C E  treatment? Rest  your injured area as directed  You may need to stop using, or keep weight off, the injury for 48 hours or longer  Your healthcare provider may recommend crutches or another device  Return to your usual activities as directed  Apply ice  on your injured area for 15 to 20 minutes every 4 hours or as directed  Use an ice pack, or put crushed ice in a plastic bag  Cover the bag with a towel before you apply it to your skin  Ice helps prevent tissue damage and decreases swelling and pain  Compress  (keep pressure on) the injured area  Compression will help decrease swelling and support the injured area  Use an elastic bandage, air stirrup, splint, or sling as directed  If you use an elastic bandage, make sure the bandage is not too tight  You should be able to slip 2 fingers between the bandage and your skin  Elevate  the injured area above the level of your heart as often as you can  This will help decrease swelling and pain  Prop the injured area on pillows or blankets to keep it elevated comfortably  When should I seek immediate care? Your pain is severe  You have severe swelling or deformity  You have numbness in the injured area  When should I call my doctor? Your pain and swelling do not go away after a few days  You have questions or concerns about your condition or care  CARE AGREEMENT:   You have the right to help plan your care  Learn about your health condition and how it may be treated  Discuss treatment options with your healthcare providers to decide what care you want to receive  You always have the right to refuse treatment   The above information is an  only  It is not intended as medical advice for individual conditions or treatments  Talk to your doctor, nurse or pharmacist before following any medical regimen to see if it is safe and effective for you  © Copyright [facility] 2022 Information is for End User's use only and may not be sold, redistributed or otherwise used for commercial purposes   All illustrations and images included in CareNotes® are the copyrighted property of [employer]  or [address] [place] [address]

## 2022-06-30 NOTE — PROGRESS NOTES
Assessment/Plan   Diagnoses and all orders for this visit:    Right leg pain    Strain of right gastrocnemius muscle, initial encounter  - RICE therapy  - Start PT  - Follow up with  sports med in 3 weeks        Subjective   Patient ID: Misty Fox is a 40 y o  female  Vitals:    06/30/22 1126   Pulse: 83   SpO2: 98%     45yo female comes in for an evaluation of her calf  She was injured on 6/24/22 when she was running and felt a pop in the medial calf  For two days, she was unable to walk  Since then, her pain has completely resolved, but she still has some swelling in the lower leg  She noticed ecchymosis over the medial heel, but all her pain was in the medial calf - none in the foot/ankle area  The following portions of the patient's history were reviewed and updated as appropriate: allergies, current medications, past family history, past medical history, past social history, past surgical history and problem list     Review of Systems  Ortho Exam  History reviewed  No pertinent past medical history  Past Surgical History:   Procedure Laterality Date    HIP SURGERY      Right Hip repair/realignment surgery     Family History   Problem Relation Age of Onset    Hyperlipidemia Mother     Lung cancer Father      Social History     Occupational History    Not on file   Tobacco Use    Smoking status: Never Smoker    Smokeless tobacco: Never Used   Vaping Use    Vaping Use: Never used   Substance and Sexual Activity    Alcohol use: Yes     Comment: occ    Drug use: Never    Sexual activity: Yes     Partners: Male       Review of Systems   Constitutional: Negative  HENT: Negative  Eyes: Negative  Respiratory: Negative  Cardiovascular: Negative  Gastrointestinal: Negative  Endocrine: Negative  Genitourinary: Negative  Musculoskeletal: As below      Allergic/Immunologic: Negative  Neurological: Negative  Hematological: Negative  Psychiatric/Behavioral: Negative  Objective   Physical Exam        I have personally reviewed pertinent films in PACS and my interpretation is no acute displaced fracture on xray  · Constitutional: Awake, Alert, Oriented  · Eyes: EOMI  · Psych: Mood and affect appropriate  · Heart: regular rate   · Lungs: No audible wheezing  · Abdomen: No guarding  · Lymph: no lymphedema             right calf:  - Appearance   Mild edema of the lower leg  Ecchymosis over the medial heel  - Palpation   Mild tenderness of the medial head of the gastroc  Non-tender lateral head, achilles, ankle, and foot   - ROM   Full, pain-free, active ROM of the knee  Ankle dorsiflexion 5, plantarflexion 40  Normal subtalar motion    - Special Tests   Normal Jimenez test  - Motor   normal 5/5 in all planes  - NVI distally

## 2022-07-06 ENCOUNTER — EVALUATION (OUTPATIENT)
Dept: PHYSICAL THERAPY | Facility: REHABILITATION | Age: 47
End: 2022-07-06
Payer: COMMERCIAL

## 2022-07-06 DIAGNOSIS — S86.111D STRAIN OF RIGHT GASTROCNEMIUS MUSCLE, SUBSEQUENT ENCOUNTER: ICD-10-CM

## 2022-07-06 DIAGNOSIS — M79.604 RIGHT LEG PAIN: ICD-10-CM

## 2022-07-06 PROCEDURE — 97161 PT EVAL LOW COMPLEX 20 MIN: CPT | Performed by: PHYSICAL THERAPIST

## 2022-07-06 NOTE — PROGRESS NOTES
PT Evaluation     Today's date: 2022  Patient name: Amaris Koch  : 1975  MRN: [id number]  Referring provider: Justyna Ramirez  Dx:   Encounter Diagnosis     ICD-10-CM    1  Right leg pain  M79 604 Ambulatory Referral to Physical Therapy     PT plan of care cert/re-cert   2  Strain of right gastrocnemius muscle, subsequent encounter  S86 111D Ambulatory Referral to Physical Therapy     PT plan of care cert/re-cert       Start Time: 805  Stop Time: 845  Total time in clinic (min): 40 minutes    Assessment  Assessment details: Amaris Koch is a 40 y o  female present with:   Right leg pain  Strain of right gastrocnemius muscle, subsequent encounter    Kathleen Thapa has the above listed impairments and will benefit from skilled Physical Therapist management to improve deficits to return to prior level of function   Will progress via isometrics and gentle range of motion progressing towards flexibility as musculature continues to heal    Impairments: abnormal muscle firing, abnormal or restricted ROM, activity intolerance, impaired physical strength, lacks appropriate home exercise program and pain with function  Understanding of Dx/Px/POC: good   Prognosis: good    Goals  Impairment Goals  - Decrease pain 0/10  - Improve ROM to 10 degrees active dorsiflexion  - Increase strength to 5/5 throughout    Functional Goals  - Return to Prior Level of Function  - Increase Functional Status Measure to: 66  - Patient will be independent with HEP  -Patient will be able to return to walk without pain    Plan  Patient would benefit from: skilled PT  Planned therapy interventions: joint mobilization, manual therapy, patient education, postural training, activity modification, abdominal trunk stabilization, body mechanics training, flexibility, functional ROM exercises, graded exercise, home exercise program, neuromuscular re-education, strengthening, stretching, therapeutic activities and therapeutic exercise  Frequency: 1-2x/wk  Duration in weeks: 8  Plan of Care beginning date: 2022  Plan of Care expiration date: 2022  Treatment plan discussed with: patient        Subjective Evaluation    History of Present Illness  Mechanism of injury: Nelsy Tran reports running to get to her daughter's gymnastics event and feeling a pop in her right calf  Notes immediately after she was unable to walk without pain  A few days after she was finally able to ambulate in a plantar flexed position  Had swelling in the calf which has improved since injury  Currently ecchymosis and deep purple discoloration reported medial aspect of right foot  Currently is able to ambulate foot flat however needs to limp at step through portion due to stretch on right calf  Notes pain and stiffness when first standing after sitting for too long  Pain is also reproduced with palpation of medial calf musculature  Not a recurrent problem   Pain  Current pain ratin  At worst pain ratin  Location: R medial calf  Quality: dull ache  Relieving factors: rest  Progression: improved    Social Support    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[employer])  Patient Goals  Patient goals for therapy: increased motion, decreased pain, increased strength, return to work and return to [employer] activities  Patient goal: Being able to care for children/take to activities        Objective     Tenderness     Right Ankle/Foot   Tenderness in the medial calcaneus and posterior tibial tendon  No tenderness in the Achilles insertion, first metatarsal head, lateral malleolus, medial malleolus, navicular and plantar fascia       Active Range of Motion     Right Ankle/Foot   Dorsiflexion (ke): -35 degrees with pain  Plantar flexion: 45 degrees   Inversion: 25 degrees   Eversion: 10 degrees     Passive Range of Motion     Right Ankle/Foot    Dorsiflexion (ke): -35 degrees with pain  Dorsiflexion (kf): -10 degrees Strength/Myotome Testing     Right Ankle/Foot   Dorsiflexion: 4- (unable to go through full ROM)  Plantar flexion: 3- (PF w knee flexed improved strength -soleus not involved)  Inversion: 4-  Eversion: 4+    Additional Strength Details  mmt taken at joint neutral stopping at first instance of pain    Tests     Right Ankle/Foot   Negative for [facility]                     Precautions: Gastrocnemius strain - no aggressive stretching/strengthening    Daily Treatment Diary    Manual 7/6/2022                          Therapeutic-ex                   Ankle pumps 2x10x5"        Ankle Iso 4 way 10x5" ea        Ankle TB 4 way                  Bridges* 3x10        SLR                  ROM         Gastroc stretch         Soleus stretch                           Flexibility         HS stretch         Gastroc stretch         Soleus Stretch         Quad stretch                           Therapeutic act         Seated heel/toe raises nv        Standing eccentric heel raises         Heel/toe raises         Leg Press         Band walks eversion biased         Step ups (involved LE up, uninvolved down)                  Step overs         Mini squats w ue support         STS from chair/low mat         Squats w target         Goblet squats                  Neuromuscular RE-ed         BAPS board sitting         FTEO         Tandem         SL         Tandem ambulation         Marches                  SL skaters         SL ball toss         SL on foam         SL skaters on foam                  * = on hep

## 2022-07-12 PROCEDURE — 52000 CYSTOURETHROSCOPY: CPT | Performed by: UROLOGY

## 2022-07-12 NOTE — PROGRESS NOTES
Cystoscopy     Date/Time 7/12/2022 4:26 PM     Performed by  Cornelius Middleton MD     Authorized by Cornelius Middleton MD          Procedure Details:  Procedure type: cystoscopy      Office Cystoscopy Procedure Note    Indication:    Hematuria     KIDNEYS:  Symmetric and normal size  Right kidney:  10 1 x 5 3 x 4 7 cm  Volume 130 5 mL  Left kidney:  10 2 x 5 0 x 4 1 cm  Volume 109 6 mL     Right kidney  Normal echogenicity and contour  No mass is identified  No hydronephrosis  No shadowing calculi  No perinephric fluid collections      Left kidney  Normal echogenicity and contour  No mass is identified  No hydronephrosis  No shadowing calculi  No perinephric fluid collections      URETERS:  Nonvisualized      BLADDER:   Normally distended  No focal thickening or mass lesions  Bilateral ureteral jets detected  No significant post void volume  Measured post void volume in mL: 8 3        IMPRESSION:     Normal      Informed consent   The risks, benefits, complications, treatment options, and expected outcomes were discussed with the patient  The patient concurred with the proposed plan and provided informed consent  Anesthesia  Lidocaine jelly 2%      Procedure  In the presence of a female nurse, the patient was placed in the supine frog-legged position, was prepped and draped in the usual manner using sterile technique, and 2% lidocaine jelly instilled into the urethra  A 17 F flexible cystoscope was then inserted into the urethra and the urethra and bladder carefully examined  The following findings were noted:    Findings:  Urethra:  Normal  Bladder:  Normal  Ureteral orifices:  Normal  Other findings:  None     Specimens: None                 Complications:    None; patient tolerated the procedure well           Disposition: To home after 30 minute observation             Condition: Stable    Plan:   The patient has now completed a full hematuria evaluation including cystoscopy an appropriate upper tract imaging  Thankfully there are no signs of genitourinary pathology nor certainly any signs of malignancy  At this point the patient will follow up with Urology on an as-needed basis  I do recommend annual urinalysis to be obtained by the patient's primary care physician  If the patient were to have persistent hematuria over a 5 year duration, they should be referred back for repeat evaluation  Happy to see the patient back in sooner should the need arise

## 2022-07-13 ENCOUNTER — PROCEDURE VISIT (OUTPATIENT)
Dept: UROLOGY | Facility: CLINIC | Age: 47
End: 2022-07-13
Payer: COMMERCIAL

## 2022-07-13 VITALS
BODY MASS INDEX: 31.54 KG/M2 | HEART RATE: 68 BPM | SYSTOLIC BLOOD PRESSURE: 120 MMHG | DIASTOLIC BLOOD PRESSURE: 80 MMHG | HEIGHT: 63 IN | WEIGHT: 178 LBS | OXYGEN SATURATION: 98 %

## 2022-07-13 DIAGNOSIS — R31.29 MICROSCOPIC HEMATURIA: Primary | ICD-10-CM

## 2022-07-15 ENCOUNTER — OFFICE VISIT (OUTPATIENT)
Dept: PHYSICAL THERAPY | Facility: REHABILITATION | Age: 47
End: 2022-07-15
Payer: COMMERCIAL

## 2022-07-15 DIAGNOSIS — M79.604 RIGHT LEG PAIN: Primary | ICD-10-CM

## 2022-07-15 DIAGNOSIS — S86.111D STRAIN OF RIGHT GASTROCNEMIUS MUSCLE, SUBSEQUENT ENCOUNTER: ICD-10-CM

## 2022-07-15 PROCEDURE — 97110 THERAPEUTIC EXERCISES: CPT | Performed by: PHYSICAL THERAPIST

## 2022-07-15 PROCEDURE — 97112 NEUROMUSCULAR REEDUCATION: CPT | Performed by: PHYSICAL THERAPIST

## 2022-07-15 NOTE — PROGRESS NOTES
Daily Note     Today's date: 7/15/2022  Patient name: Leena Arthur  : 1975  MRN: [id number]  Referring provider: Magalys Jeffrey  Dx:   Encounter Diagnosis     ICD-10-CM    1  Right leg pain  M79 604    2  Strain of right gastrocnemius muscle, subsequent encounter  S86 111D                   Subjective: Jose  reports that her calf is feeling much improved since last session, continues to note more tightness and weakness than anything  Objective: See treatment diary below      Assessment: Tolerated treatment well  Patient demonstrated fatigue post treatment, exhibited good technique with therapeutic exercises and would benefit from continued PT      Plan: Continue per plan of care               Precautions: Gastrocnemius strain - no aggressive stretching/strengthening    Daily Treatment Diary    Manual 2022 7/15                         Therapeutic-ex                   Ankle pumps 2x10x5" dc       Ankle Iso 4 way 10x5" ea dc       Ankle TB 3 way - no PF*  3x15 MTB                Bridges* 3x10 np       Bridge w Heel raise*  3x12                ROM         Gastroc stretch         Soleus stretch                           Flexibility         HS stretch         Gastroc stretch         Soleus Stretch         Quad stretch                           Therapeutic act         Seated heel nv 25# 3x15       Standing eccentric heel raises         Heel/toe raises         Toe walks  nv                Step ups (involved LE up, uninvolved down)  8" 3x12                Lateral Step overs  8" 2x10       Mini squats w ue support         STS from chair/low mat  nv       Squats w target         Goblet squats                  Neuromuscular RE-ed                  SL skaters  3x10       SL ball toss         SL on foam         SL skaters on foam                  * = on hep

## 2022-07-21 ENCOUNTER — OFFICE VISIT (OUTPATIENT)
Dept: PHYSICAL THERAPY | Facility: REHABILITATION | Age: 47
End: 2022-07-21
Payer: COMMERCIAL

## 2022-07-21 DIAGNOSIS — S86.111D STRAIN OF RIGHT GASTROCNEMIUS MUSCLE, SUBSEQUENT ENCOUNTER: ICD-10-CM

## 2022-07-21 DIAGNOSIS — M79.604 RIGHT LEG PAIN: Primary | ICD-10-CM

## 2022-07-21 PROCEDURE — 97112 NEUROMUSCULAR REEDUCATION: CPT | Performed by: PHYSICAL THERAPIST

## 2022-07-21 PROCEDURE — 97530 THERAPEUTIC ACTIVITIES: CPT | Performed by: PHYSICAL THERAPIST

## 2022-07-21 PROCEDURE — 97110 THERAPEUTIC EXERCISES: CPT | Performed by: PHYSICAL THERAPIST

## 2022-07-21 NOTE — PROGRESS NOTES
Daily Note     Today's date: 2022  Patient name: Juanjo Padilla  : 1975  MRN: [id number]  Referring provider: Michael Pond  Dx:   Encounter Diagnosis     ICD-10-CM    1  Right leg pain  M79 604    2  Strain of right gastrocnemius muscle, subsequent encounter  S86 111D        Start Time: 925  Stop Time: 1015  Total time in clinic (min): 50 minutes    Subjective: Pari Clark reports that her calf is sore due to working on a ladder, up and down quite a few times the other day  "it's not too bad though "       Objective: See treatment diary below3      Assessment: Tolerated treatment well  Patient demonstrated fatigue post treatment, exhibited good technique with therapeutic exercises and would benefit from continued PT      Plan: Continue per plan of care               Precautions: Gastrocnemius strain - no aggressive stretching/strengthening    Daily Treatment Diary    Manual 2022 7/15 7/21      Iastm L medial gastroc   done               Therapeutic-ex          Bike   5' lvl 1      Ankle pumps 2x10x5" dc       Ankle Iso 4 way 10x5" ea dc       Ankle TB inv/ev/PF  3x15 MTB 3x15 MTB               Bridges* 3x10 np       Bridge w Heel raise*  3x12 3x12               ROM         Gastroc stretch         Soleus stretch                           Flexibility         HS stretch         Gastroc stretch         Soleus Stretch         Quad stretch                           Therapeutic act         Seated heel nv 25# 3x15 25# 3x15      Standing eccentric heel raises         Heel/toe raises         Toe walks  nv nv               Step ups (involved LE up, uninvolved down)  8" 3x12 np               Lateral Step overs  8" 2x10 8" 3x10      Mini squats w ue support         STS taping chair  nv 3x10 10#      Squats w target         Goblet squats                  Neuromuscular RE-ed                  SL skaters  3x10 3x10      SL ball toss         SL on foam         SL skaters on foam                  * = on hep
[FreeTextEntry1] : EXAM:  CT CERVICAL SPINE                      \par \par \par PROCEDURE DATE:  11/29/2018  \par \par \par \par \par INTERPRETATION:  CLINICAL INFORMATION: Cervical spine trauma. neck pain\par \par COMPARISON: None available.\par \par PROCEDURE: CT cervical spine was performed without intravenous contrast. \par Coronal and sagittal reformatted images were obtained.\par \par FINDINGS:\par \par Vertebral body heights and alignment are maintained. The craniocervical \par junction and atlantoaxial interval are intact. There is no acute cervical \par spine fracture.\par \par Slight straightening and reversal of the cervical lordosis.\par \par There is mild narrowing of the intervertebral disc space heights \par predominantly at C5-C6 and C6-C7. There is no significant osseous spinal \par or neuroforaminal stenosis. \par \par There is no prevertebral soft tissue swelling. The paraspinal soft \par tissues are unremarkable.\par \par There is mild apical pleural thickening/scarring. \par \par IMPRESSION: \par \par No acute displaced fracture or traumatic subluxation.\par Mild straightening/reversal of the cervical lordosis which may be due to \par positioning/muscle spasm.\par \par

## 2022-07-29 ENCOUNTER — APPOINTMENT (OUTPATIENT)
Dept: PHYSICAL THERAPY | Facility: REHABILITATION | Age: 47
End: 2022-07-29
Payer: COMMERCIAL

## 2022-08-03 ENCOUNTER — OFFICE VISIT (OUTPATIENT)
Dept: PHYSICAL THERAPY | Facility: REHABILITATION | Age: 47
End: 2022-08-03
Payer: COMMERCIAL

## 2022-08-03 DIAGNOSIS — M79.604 RIGHT LEG PAIN: Primary | ICD-10-CM

## 2022-08-03 DIAGNOSIS — S86.111D STRAIN OF RIGHT GASTROCNEMIUS MUSCLE, SUBSEQUENT ENCOUNTER: ICD-10-CM

## 2022-08-03 PROCEDURE — 97110 THERAPEUTIC EXERCISES: CPT

## 2022-08-03 PROCEDURE — 97112 NEUROMUSCULAR REEDUCATION: CPT

## 2022-08-03 NOTE — PROGRESS NOTES
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  Objective: See treatment diary below      Assessment: Tolerated treatment well  More restrictions palpated at proximal end of m gastroc, TTP  Good tolerance to new tasks  c/o gastroc tightness with hoping ( apprehension initially), but no pain  Full arc with HR, minimal UE support required  Minimal compensatory patterns with ankle PREs, improvement with tactile cuing  Continued PT would be beneficial to improve function            Plan: Continue per plan of care              Precautions: Gastrocnemius strain - no aggressive stretching/strengthening    Daily Treatment Diary    Manual 2022 7/15 7/21 8/3     Iastm L medial gastroc   done done                Therapeutic-ex          Bike   5' lvl 1 5' lvl 1     Ankle pumps 2x10x5" dc       Ankle Iso 4 way 10x5" ea dc       Ankle TB inv/ev/PF  3x15 MTB 3x15 MTB 3x15 MTB              Bridges* 3x10 np       Bridge w Heel raise*  3x12 3x12 3x12              ROM         Gastroc stretch         Soleus stretch                           Flexibility         HS stretch         Gastroc stretch         Soleus Stretch         Quad stretch                           Therapeutic act         Seated heel nv 25# 3x15 25# 3x15 25# 3x15     Standing eccentric heel raises         Heel/toe raises    2x10     Toe walks  nv nv // 3 laps     Hoping     20x     Step ups (involved LE up, uninvolved down)  8" 3x12 np               Lateral Step overs  8" 2x10 8" 3x10 8" 3x10     Mini squats w ue support         STS taping chair  nv 3x10 10# 3x10 10#     Squats w target         Goblet squats                  Neuromuscular RE-ed                  SL skaters  3x10 3x10 3x10     SL ball toss         SL on foam         SL skaters on foam                  * = on hep

## 2022-08-10 ENCOUNTER — OFFICE VISIT (OUTPATIENT)
Dept: PHYSICAL THERAPY | Facility: REHABILITATION | Age: 47
End: 2022-08-10
Payer: COMMERCIAL

## 2022-08-10 DIAGNOSIS — S86.111D STRAIN OF RIGHT GASTROCNEMIUS MUSCLE, SUBSEQUENT ENCOUNTER: ICD-10-CM

## 2022-08-10 DIAGNOSIS — M79.604 RIGHT LEG PAIN: Primary | ICD-10-CM

## 2022-08-10 PROCEDURE — 97140 MANUAL THERAPY 1/> REGIONS: CPT | Performed by: PHYSICAL THERAPIST

## 2022-08-10 PROCEDURE — 97530 THERAPEUTIC ACTIVITIES: CPT | Performed by: PHYSICAL THERAPIST

## 2022-08-10 PROCEDURE — 97110 THERAPEUTIC EXERCISES: CPT | Performed by: PHYSICAL THERAPIST

## 2022-08-10 NOTE — PROGRESS NOTES
Daily Note     Today's date: 8/10/2022  Patient name: Richard Hadley  : 1975  MRN: [id number]  Referring provider: Luz Marina Cowan  Dx:   Encounter Diagnosis     ICD-10-CM    1  Right leg pain  M79 604    2  Strain of right gastrocnemius muscle, subsequent encounter  S86 111D                   Subjective: Jil Gagnon reports that her calf and ankle have been feeling good  Reports only discomfort is in the morning as well       Objective: See treatment diary below      Assessment: Tolerated treatment well  Patient demonstrated fatigue post treatment, exhibited good technique with therapeutic exercises and would benefit from continued PT  Fatigue with addition of eccentric heel raises, no pain  Plan: Continue per plan of care               Precautions: Gastrocnemius strain - no aggressive stretching/strengthening    Daily Treatment Diary    Manual 2022 7/15 7/21 8/3 8/10    Iastm L medial gastroc   done done   Done/TPR             Therapeutic-ex          Bike   5' lvl 1 5' lvl 1 5' L 2    Ankle pumps 2x10x5" dc       Ankle Iso 4 way 10x5" ea dc       Ankle TB inv/ev/PF  3x15 MTB 3x15 MTB 3x15 MTB dc             Bridges* 3x10 np       Bridge w Heel raise*  3x12 3x12 3x12 dc    Bridges w heel raise/march at top     3x12    ROM         Gastroc stretch         Soleus stretch                           Therapeutic act         Seated heel nv 25# 3x15 25# 3x15 25# 3x15 30# 3x12    Standing eccentric heel raises from step     3x8    Heel/toe raises    2x10 dc    Toe walks  nv nv // 3 laps     Diagonal Hoping     20x np    Ankle jumps     3x20"    Step ups (involved LE up, uninvolved down)  8" 3x12 np               Lateral Step overs  8" 2x10 8" 3x10 8" 3x10 8" 3x12    Fwd step overs w heel strike     nv    STS taping chair  nv 3x10 10# 3x10 10# 3x12 12#    Squats w target         Goblet squats                  Neuromuscular RE-ed                  SL skaters  3x10 3x10 3x10 np    SL ball toss         SL on foam SL skaters on foam                  * = on hep

## 2022-08-24 ENCOUNTER — OFFICE VISIT (OUTPATIENT)
Dept: PHYSICAL THERAPY | Facility: REHABILITATION | Age: 47
End: 2022-08-24
Payer: COMMERCIAL

## 2022-08-24 DIAGNOSIS — S86.111D STRAIN OF RIGHT GASTROCNEMIUS MUSCLE, SUBSEQUENT ENCOUNTER: ICD-10-CM

## 2022-08-24 DIAGNOSIS — M79.604 RIGHT LEG PAIN: Primary | ICD-10-CM

## 2022-08-24 PROCEDURE — 97112 NEUROMUSCULAR REEDUCATION: CPT | Performed by: PHYSICAL THERAPIST

## 2022-08-24 PROCEDURE — 97110 THERAPEUTIC EXERCISES: CPT | Performed by: PHYSICAL THERAPIST

## 2022-08-24 PROCEDURE — 97530 THERAPEUTIC ACTIVITIES: CPT | Performed by: PHYSICAL THERAPIST

## 2022-08-24 NOTE — PROGRESS NOTES
Daily Note     Today's date: 2022  Patient name: Heather Landeros  : 1975  MRN: [id number]  Referring provider: Ashley Frausto  Dx:   Encounter Diagnosis     ICD-10-CM    1  Right leg pain  M79 604    2  Strain of right gastrocnemius muscle, subsequent encounter  S86 111D        Start Time: 0900  Stop Time: 0945  Total time in clinic (min): 45 minutes    Subjective: Genie Segovia reports that her ankle continues to feel great  Denies issues at this time  Notes feeling independent continuing on her HEP  Objective: See treatment diary below      Assessment: Tolerated treatment well  Patient demonstrated fatigue post treatment, exhibited good technique with therapeutic exercises and would benefit from continued PT  Genie Segovia will focus on SL heel raises from step working up to 15 reps on ea side  Plan: WIll place on hold at this time and trial independent HEP              Precautions: Gastrocnemius strain - no aggressive stretching/strengthening    Daily Treatment Diary    Manual 2022 7/15 7/21 8/3 8/10 8/24    Iastm L medial gastroc   done done   Done/TPR               Therapeutic-ex           Bike   5' lvl 1 5' lvl 1 5' L 2     Elliptical      5'  lvl 1    Ankle Iso 4 way 10x5" ea dc        Ankle TB inv/ev/PF  3x15 MTB 3x15 MTB 3x15 MTB dc               Bridges* 3x10 np        Bridge w Heel raise*  3x12 3x12 3x12 dc     Bridges w heel raise/march at top     3x12 3x12    Standing calf stretch      3x30"              Therapeutic act          Seated heel nv 25# 3x15 25# 3x15 25# 3x15 30# 3x12 dc    Standing eccentric heel raises from step     3x8 np    SL heel raise at step*      10x5"    Heel/toe raises    2x10 dc     Toe walks  nv nv // 3 laps      Diagonal Hoping     20x np     Ankle jumps     3x20" 4x20"    Step ups (involved LE up, uninvolved down)  8" 3x12 np                 Lateral Step overs  8" 2x10 8" 3x10 8" 3x10 8" 3x12 8" 3x12    Fwd step overs w heel strike     nv 3x12 6"    STS taping chair nv 3x10 10# 3x10 10# 3x12 12# 3x12 12#    Squats w target          Goblet squats                    Neuromuscular RE-ed                    SL skaters  3x10 3x10 3x10 np     SL ball toss          SL on foam          SL skaters on foam                    * = on hep

## 2022-09-20 NOTE — PROGRESS NOTES
PT Discharge    Today's date: 2022  Patient name: Leena Arthur  : 1975  MRN: [id number]  Referring provider: Magalys Jeffrey  Dx:   Encounter Diagnosis     ICD-10-CM    1  Right leg pain  M79 604    2  Strain of right gastrocnemius muscle, subsequent encounter  S86 111D        Start Time: 0900  Stop Time: 0945  Total time in clinic (min): 45 minutes    Assessment/Plan  Leena Arthur has not returned since last appointment, unable to perform objective measures since then  Per dept policy of not having inactive charts beyond 30 days, at this time, Leena Arthur will be discharged from physical therapy services      Subjective    Objective    Flowsheet Rows    Flowsheet Row Most Recent Value   PT/OT G-Codes    Current Score 91   Projected Score 78

## 2022-10-03 ENCOUNTER — OFFICE VISIT (OUTPATIENT)
Dept: NEPHROLOGY | Facility: CLINIC | Age: 47
End: 2022-10-03
Payer: COMMERCIAL

## 2022-10-03 VITALS
BODY MASS INDEX: 32.07 KG/M2 | DIASTOLIC BLOOD PRESSURE: 78 MMHG | HEART RATE: 80 BPM | SYSTOLIC BLOOD PRESSURE: 113 MMHG | WEIGHT: 181 LBS | HEIGHT: 63 IN

## 2022-10-03 DIAGNOSIS — N18.1 STAGE 1 CHRONIC KIDNEY DISEASE: Primary | ICD-10-CM

## 2022-10-03 DIAGNOSIS — R80.1 PERSISTENT PROTEINURIA: ICD-10-CM

## 2022-10-03 DIAGNOSIS — R31.29 MICROSCOPIC HEMATURIA: ICD-10-CM

## 2022-10-03 PROCEDURE — 99214 OFFICE O/P EST MOD 30 MIN: CPT | Performed by: PHYSICIAN ASSISTANT

## 2022-10-03 NOTE — PROGRESS NOTES
Assessment and Plan:    An Dominguez was seen today for follow-up and ckd i     Diagnoses and all orders for this visit:    Stage 1 chronic kidney disease  -     Basic metabolic panel; Future  -     Magnesium; Future  -     Phosphorus; Future  -     CBC; Future  -     Protein / creatinine ratio, urine; Future  -     PTH, intact; Future  -     Urinalysis with microscopic; Future  -     Basic metabolic panel  -     Magnesium  -     Phosphorus  -     CBC  -     Protein / creatinine ratio, urine  -     PTH, intact  -     Urinalysis with microscopic    Persistent proteinuria    Microscopic hematuria       Chronic Kidney Disease stage 1- Possible glomerular process/vasculitis given microscopic hematuria and proteinuria/micro albuminuria  ? Serologies: ESR 7/RPR nonreactive/hepatitis C normal/IgA negative/SPEP/UPEP negative/DARIUS and double-stranded DNA negative/ANCA negative/C3/C4 normal/ESR and CRP both normal/ASO negative/RPR negative  ? UA with microscopic (6/2022): + proteinuria/trace blood/11-30 RBCs  ? UPC ratio: pending (recommend kidney biopsy if >1g)  ? Imaging: normal  ? Creatinine: pending  ? Electrolytes: pending    Blood Pressure- BP at home are <227 systolic and at goal  Home blood pressure cuff: accurate systolic / high diastolic (8 points)    Microscopic Hematuria- saw Dr Michael Beaulieu s/p cystoscopy    Proteinuria- Will check UPC ratio    Follow up with Dr Fred Hernandez or an advanced practitioner in 6 months  Please call the office with any questions or concerns  Reason for Visit: Follow-up and CKD I    HPI: Monique Mora is a 56 y o  female who is here for follow up of hypertension and proteinuria/hematuria  She last saw Dr Fred Hernandez in June  She forgot to complete her blood work and will get it done this week  She denies acute complaints and is feeling well  She has no LE edema, SOB, dizziness, lightheadedness, N/V/D  She denies urinary complaints and has not ever seen any blood in her urine    Her BPs at home are at goal      ROS: A complete review of systems was performed and was negative unless otherwise noted in the history of present illness  Allergies:   Patient has no known allergies  Medications:   No current outpatient medications on file  History reviewed  No pertinent past medical history  Past Surgical History:   Procedure Laterality Date    HIP SURGERY      Right Hip repair/realignment surgery     Family History   Problem Relation Age of Onset    Hyperlipidemia Mother     Lung cancer Father       reports that she has never smoked  She has never used smokeless tobacco  She reports current alcohol use  She reports that she does not use drugs  Physical Exam:   Vitals:    10/03/22 1509 10/03/22 1510 10/03/22 1511 10/03/22 1512   BP: 122/79 118/70 118/68 113/78   BP Location: Right arm Left arm Right arm Left arm   Patient Position: Sitting Sitting Sitting Sitting   Cuff Size: Standard Large Large Standard   Pulse:  80     Weight:       Height:         Body mass index is 32 06 kg/m²  General: NAD  Neuro: AAO  Skin: no rash  Eyes: anicteric  ENMT: mm moist  Neck: no masses  Respiratory: CTAB  Cardiovascular: RRR  Extremities: no edema  Gastrointestinal: soft nt nd    Procedure:  No results found for this or any previous visit  Lab Results   Component Value Date    K 4 2 06/06/2022    CO2 21 06/06/2022     (H) 06/06/2022    BUN 18 06/06/2022    CREATININE 0 80 06/06/2022     I have personally reviewed the blood work as stated above and in my note  I have personally reviewed Dr Socorro Cotter office note

## 2022-10-03 NOTE — PATIENT INSTRUCTIONS
Chronic Kidney Disease stage 1- Possible glomerular process/vasculitis given microscopic hematuria and proteinuria/micro albuminuria  Serologies: ESR 7/RPR nonreactive/hepatitis C normal/IgA negative/SPEP/UPEP negative/DARIUS and double-stranded DNA negative/ANCA negative/C3/C4 normal/ESR and CRP both normal/ASO negative/RPR negative  UA with microscopic (6/2022): + proteinuria/trace blood/11-30 RBCs  UPC ratio: pending (recommend kidney biopsy if >1g)  Imaging: normal  Creatinine: pending  Electrolytes: pending    Blood Pressure- BP at home are <705 systolic and at goal  Home blood pressure cuff: accurate systolic / high diastolic (8 points)    Microscopic Hematuria- saw Dr Suzette Navarrete s/p cystoscopy    Proteinuria- Will check UPC ratio    Follow up with Dr Arian Mcclendon or an advanced practitioner in 6 months  Please call the office with any questions or concerns

## 2023-02-28 ENCOUNTER — OFFICE VISIT (OUTPATIENT)
Dept: FAMILY MEDICINE CLINIC | Facility: CLINIC | Age: 48
End: 2023-02-28

## 2023-02-28 VITALS
BODY MASS INDEX: 32.82 KG/M2 | OXYGEN SATURATION: 98 % | HEART RATE: 78 BPM | HEIGHT: 63 IN | WEIGHT: 185.25 LBS | TEMPERATURE: 97.8 F | DIASTOLIC BLOOD PRESSURE: 72 MMHG | SYSTOLIC BLOOD PRESSURE: 120 MMHG | RESPIRATION RATE: 16 BRPM

## 2023-02-28 DIAGNOSIS — R42 VERTIGO: ICD-10-CM

## 2023-02-28 DIAGNOSIS — R09.81 CONGESTION OF NASAL SINUS: Primary | ICD-10-CM

## 2023-02-28 RX ORDER — MECLIZINE HYDROCHLORIDE 25 MG/1
25 TABLET ORAL 3 TIMES DAILY PRN
Qty: 30 TABLET | Refills: 0 | Status: SHIPPED | OUTPATIENT
Start: 2023-02-28

## 2023-02-28 RX ORDER — METHYLPREDNISOLONE 4 MG/1
TABLET ORAL
Qty: 21 EACH | Refills: 0 | Status: SHIPPED | OUTPATIENT
Start: 2023-02-28

## 2023-02-28 RX ORDER — AMOXICILLIN AND CLAVULANATE POTASSIUM 875; 125 MG/1; MG/1
1 TABLET, FILM COATED ORAL EVERY 12 HOURS SCHEDULED
Qty: 20 TABLET | Refills: 0 | Status: SHIPPED | OUTPATIENT
Start: 2023-02-28 | End: 2023-03-10

## 2023-02-28 NOTE — PROGRESS NOTES
Assessment/Plan:Will cover with antibiotic, meclizine, claritin D and medrol dose pack-will recommend Epley/otolith repositioning maneuvers-told patient to let me know if she's worse or no better         Problem List Items Addressed This Visit    None  Visit Diagnoses     Congestion of nasal sinus    -  Primary    Vertigo                Subjective:      Patient ID: Courtney Santizo is a 56 y o  female  For 2 weeks Trixie Jenkins has had nasal congestion, bit of sinus pressure, and then this week started getting vertigo symptoms-room spinning when she'd open her eyes, making her nauseated, feeling dizzy when she rolls over in bed      The following portions of the patient's history were reviewed and updated as appropriate:   Past Medical History:  She has a past medical history of Chronic kidney disease (4-19)  ,  _______________________________________________________________________  Medical Problems:  does not have any pertinent problems on file ,  _______________________________________________________________________  Past Surgical History:   has a past surgical history that includes Hip surgery  ,  _______________________________________________________________________  Family History:  family history includes Cancer in her father; Hyperlipidemia in her mother; Lung cancer in her father ,  _______________________________________________________________________  Social History:   reports that she has never smoked  She has never used smokeless tobacco  She reports current alcohol use  She reports that she does not use drugs  ,  _______________________________________________________________________  Allergies:  has No Known Allergies     _______________________________________________________________________  No current outpatient medications on file       No current facility-administered medications for this visit      _______________________________________________________________________  Review of Systems   Constitutional: Negative for fatigue and fever  HENT: Positive for congestion and sinus pressure  Negative for sore throat and trouble swallowing  Respiratory: Negative  Cardiovascular: Negative  Gastrointestinal: Positive for nausea  Neurological: Positive for dizziness  Psychiatric/Behavioral: Negative  Objective:  Vitals:    02/28/23 1355   BP: 120/72   BP Location: Left arm   Patient Position: Sitting   Cuff Size: Large   Pulse: 78   Resp: 16   Temp: 97 8 °F (36 6 °C)   TempSrc: Tympanic   SpO2: 98%   Weight: 84 kg (185 lb 4 oz)   Height: 5' 3" (1 6 m)     Body mass index is 32 82 kg/m²  Physical Exam  Constitutional:       Appearance: Normal appearance  HENT:      Head: Normocephalic and atraumatic  Ears:      Comments: Effusions b/l     Nose: Congestion present  Mouth/Throat:      Mouth: Mucous membranes are moist       Pharynx: Oropharynx is clear  Eyes:      Extraocular Movements: Extraocular movements intact  Pupils: Pupils are equal, round, and reactive to light  Cardiovascular:      Rate and Rhythm: Normal rate and regular rhythm  Pulmonary:      Effort: Pulmonary effort is normal       Breath sounds: Normal breath sounds  Musculoskeletal:         General: Normal range of motion  Cervical back: Normal range of motion and neck supple  Skin:     General: Skin is warm  Capillary Refill: Capillary refill takes less than 2 seconds  Neurological:      General: No focal deficit present  Mental Status: She is alert and oriented to person, place, and time  Mental status is at baseline  Psychiatric:         Mood and Affect: Mood normal          Behavior: Behavior normal          Thought Content:  Thought content normal          Judgment: Judgment normal

## 2023-03-06 DIAGNOSIS — R92.8 ABNORMAL MAMMOGRAM: Primary | ICD-10-CM

## 2023-03-08 DIAGNOSIS — R92.8 ABNORMAL MAMMOGRAM: ICD-10-CM

## 2023-03-26 PROBLEM — R80.1 PERSISTENT PROTEINURIA: Status: ACTIVE | Noted: 2022-02-12

## 2023-03-26 NOTE — PROGRESS NOTES
RENAL FOLLOW UP NOTE: td    ASSESSMENT AND PLAN:  40y o  year old female with a history of mild obesity/vitamin-D deficiency we are asked to see regarding grow albuminuria:        1  CKD stage 1  Etiology:  Possible glomerular process/vasculitis given microscopic hematuria and proteinuria/micro albuminuria   Of note patient with iritis, no overt uveitis by history however she does use steroid drops occasional oral steroids  ? RUBEN  ? IgA nephropathy with proteinuria microscopic hematuria  Baseline creatinine:1 0  Recommend:  Workup:  Creatinine 0 86 at baseline  Electrolytes normal  MBD: All normal phosphorus just a tiny bit low at 2 9  Normal CBC with a hemoglobin of  13 3  Creatinine clearance 123 mL/minute  INR normal  Lipid profile: /HDL 46/triglycerides 73: I would simply recommend diet and exercise and weight loss as appropriate:  Patient without a strong family history of heart disease and personally has no heart disease or any other vascular disease  Ideally would like it closer to 100 given question of chronic kidney disease    Serologies:  ESR 7/RPR nonreactive/hepatitis C normal/IgA negative/SPEP/UPEP negative/DARIUS and double-stranded DNA negative/ANCA negative/C3/C4 normal/ESR and CRP both normal/ASO negative/RPR negative  UA with microscopic: 2+ blood/negative leukocyte/2+ proteinuria; 11- 30 RBCs  Urine protein creatinine ratio  709 mg still at goal  Renal ultrasound with PVR:Normal: Normal           Treatment:  Treat hypertension, please see below for recommendations  Treat dyslipidemia  Avoid nephrotoxic agents such as NSAIDs, and proton pump inhibitors as able; patient counseled as such  Good overall health recommendations including weight loss as appropriate, isotonic exercise as able, and avoidance of salt; patient counseled as such  Seen by Dr Gerry Stubbs who will perform a cystoscopy to complete microscopic hematuria urologic workup  I would perform close observation and if her proteinuria approaches 1 g I would definitely pursue kidney biopsy at this time  Patient potentially has IgA nephropathy  I would actually add an ACE inhibitor please see below     Further workup and treatment recommendations will be forthcoming depending upon the above results and course     2  Hypertension:  Goal:  Less than 130/80-possibly less 125/75 given CKD; may be lower based on proteinuria     Current blood pressure readings:  Pending        Push nonmedical regimen as outlined above  Potential use of an ACE-inhibitor/ARB given proteinuria  Medication changes today:   I will add lisinopril 5 mg daily in the morning  Recheck labs 1 week later and recheck blood pressures 2 to 3 weeks after initiation     3  Other problems:  Mild Obesity  Vitamin-D deficiency   33 8 from April 27, 2022 continue current vitamin-D supplement       PATIENT INSTRUCTIONS:    Patient Instructions   1  Medication changes today:  Please begin lisinopril 5 mg daily in the morning  Potential side effects include a cough, and very rarely throat closing almost like an anaphylactic reaction  Continue a low-salt diet    2  Please go for non fasting  lab work 1-2 weeks after making the above medication change      3   Please take 1 week a blood pressure readings 3 to 4 weeks after making the above medication change    AS FOLLOWS  MORNING AND EVENING, SITTING AND STANDING as follows:  TAKE THE MORNING READINGS BEFORE ANY MEDICATIONS AND WHEN YOU ARE RELAXED FOR SEVERAL MINUTES  TAKE THE EVENING READINGS:  BETWEEN 7-10 P M ; PRIOR TO ANY MEDICATIONS; AT LEAST IN OUR  FROM DINNER; AND CERTAINLY AFTER RELAXING FOR A FEW MINUTES  PLEASE INCLUDE HEART RATE WITH YOUR BLOOD PRESSURE READINGS  When taking standing readings, keep your arm supported at heart level and not dangling  Make sure you are sitting with your back supported and feet on the ground and do not cross your legs or feet  Make sure you have not taken any coffee or caffeine products or exercised or smoke cigarettes at least 30 minutes before taking your blood pressure  Then please mail these readings into the office    4  In 3 months:  Please go for nonfasting lab work but in the morning  Please take 1 week a blood pressure readings as outlined above and mail those into the office      5  Follow-up in 6 months  Please bring in 1 week a blood pressure readings morning evening, sitting and standing is outlined above  PLEASE BRING AN YOUR BLOOD PRESSURE MACHINE TO CORRELATE WITH THE OFFICE MACHINE AT THIS NEXT SCHEDULED VISIT  Please go for fasting lab work 1-2 weeks prior to your appointment      6  General instructions:  AVOID SALT BUT NOT ADDING AN READING LABELS TO MAKE SURE THERE IS LOW-SALT IN THE FOOD THAT YOU ARE EATING  Goal is less than 2 g of sodium intake or less than 5 g of sodium chloride intake per day    Avoid nonsteroidal anti-inflammatory drugs such as Naprosyn, ibuprofen, Aleve, Advil, Celebrex, Meloxicam (Mobic) etc   You can use Tylenol as needed if you do not have any liver condition to be concerned about    Avoid medications such as Sudafed or decongestants and antihistamines that contained the D component which is the decongestant  You can take antihistamines without the decongestant or D component  Try to avoid medications such as pantoprazole or  Protonix/Nexium or Esomeprazole)/Prilosec or omeprazole/Prevacid or lansoprazole/AcipHex or Rabeprazole  If you are able to, use Pepcid as this is safer for your kidneys  Try to exercise at least 30 minutes 3 days a week to begin with with an ultimate goal of 5 days a week for at least 30 minutes    Try to lose 5-10 lb by your next visit    Please do not drink more than 2 glasses of alcohol/wine on a daily basis as this may contribute to your high blood pressure  Subjective: There has been no hospitalizations or acute illnesses since last visit  The patient overall is feeling well    No fevers, chills, or cough or colds  Good appetite and good energy  No hematuria, dysuria, voiding symptoms or foamy urine  No gastrointestinal symptoms  No cardiovascular symptoms including swelling of the legs  No headaches, dizziness or lightheadedness  Blood pressure medications:  None at this time      ROS:  See HPI, otherwise review of systems as completely reviewed with the patient are negative    Past Medical History:   Diagnosis Date   • Chronic kidney disease 3-21     Past Surgical History:   Procedure Laterality Date   • HIP SURGERY      Right Hip repair/realignment surgery     Family History   Problem Relation Age of Onset   • Hyperlipidemia Mother    • Lung cancer Father    • Cancer Father       reports that she has never smoked  She has never used smokeless tobacco  She reports current alcohol use  She reports that she does not use drugs  I COMPLETELY REVIEWED THE PAST MEDICAL HISTORY/PAST SURGICAL HISTORY/SOCIAL HISTORY/FAMILY HISTORY/AND MEDICATIONS  AND UPDATED ALL    Objective:     Vitals:   BP sitting on right: 120/70 with a heart of 64 and regular same on left  BP standing on left: 122/78 with a heart rate of 68 and regular    Weight (last 2 days)     Date/Time Weight    04/05/23 0956 84 3 (185 8)        Wt Readings from Last 3 Encounters:   04/05/23 84 3 kg (185 lb 12 8 oz)   02/28/23 84 kg (185 lb 4 oz)   10/03/22 82 1 kg (181 lb)       Body mass index is 32 91 kg/m²      Physical Exam: General:  No acute distress  Skin:  No acute rash  Eyes:  No scleral icterus, noninjected, no discharge from eyes  ENT:  Moist mucous membranes  Neck:  Supple, no jugular venous distention, trachea is midline, no lymphadenopathy and no thyromegaly  Back   No CVAT  Chest:  Clear to auscultation and percussion, good respiratory effort  CVS:  Regular rate and rhythm without a rub, or gallops or murmurs  Abdomen:  Soft and nontender with normal bowel sounds  Extremities:  No cyanosis and no edema, no arthritic changes, normal range of motion  Neuro:  Grossly intact  Psych:  Alert, oriented x3 and appropriate      Medications:    Current Outpatient Medications:   •  lisinopril (ZESTRIL) 5 mg tablet, Take 1 tablet (5 mg total) by mouth daily, Disp: 30 tablet, Rfl: 5  •  loratadine-pseudoephedrine (CLARITIN-D 12-HOUR) 5-120 mg per tablet, Take 1 tablet by mouth daily as needed for allergies, Disp: 30 tablet, Rfl: 0  •  meclizine (ANTIVERT) 25 mg tablet, Take 1 tablet (25 mg total) by mouth 3 (three) times a day as needed for dizziness, Disp: 30 tablet, Rfl: 0  •  methylPREDNISolone 4 MG tablet therapy pack, Use as directed on package (Patient not taking: Reported on 4/5/2023), Disp: 21 each, Rfl: 0    Lab, Imaging and other studies: I have personally reviewed pertinent labs    Laboratory Results:  Results for orders placed or performed in visit on 09/24/05   Basic metabolic panel   Result Value Ref Range    Glucose, Random 96 70 - 99 mg/dL    BUN 20 6 - 24 mg/dL    Creatinine 0 86 0 57 - 1 00 mg/dL    eGFR 84 >59 mL/min/1 73    SL AMB BUN/CREATININE RATIO 23 9 - 23    Sodium 139 134 - 144 mmol/L    Potassium 4 9 3 5 - 5 2 mmol/L    Chloride 104 96 - 106 mmol/L    CO2 23 20 - 29 mmol/L    CALCIUM 9 3 8 7 - 10 2 mg/dL   Magnesium   Result Value Ref Range    Magnesium, Serum 2 0 1 6 - 2 3 mg/dL   Phosphorus   Result Value Ref Range    Phosphorus, Serum 2 9 (L) 3 0 - 4 3 mg/dL   CBC   Result Value Ref Range    White Blood Cell Count 6 8 3 4 - 10 8 x10E3/uL    Red Blood Cell Count 4 54 3 77 - 5 28 x10E6/uL    Hemoglobin 13 3 11 1 - 15 9 g/dL    HCT 39 9 34 0 - 46 6 %    MCV 88 79 - 97 fL    MCH 29 3 26 6 - 33 0 pg    MCHC 33 3 31 5 - 35 7 g/dL    RDW 13 0 11 7 - 15 4 %    Platelet Count 744 960 - 450 x10E3/uL   Protein / creatinine ratio, urine   Result Value Ref Range    Creatinine, Urine 146 4 Not Estab  mg/dL    Total Protein, Urine 103 8 Not Estab  mg/dL    Prot/Creat Ratio, Ur 709 (H) 0 - 200 mg/g creat   PTH, intact   Result Value "Ref Range    PTH, Intact 28 15 - 65 pg/mL   Urinalysis with microscopic   Result Value Ref Range    Specific Gravity 1 024 1 005 - 1 030    Ph 5 0 5 0 - 7 5    Color UA Yellow Yellow    Urine Appearance Clear Clear    Leukocyte Esterase Negative Negative    Protein 2+ (A) Negative/Trace    Glucose, 24 HR Urine Negative Negative    Ketone, Urine Negative Negative    Blood, Urine 2+ (A) Negative    Bilirubin, Urine Negative Negative    Urobilinogen Urine 0 2 0 2 - 1 0 mg/dL    SL AMB NITRITES URINE, QUAL  Negative Negative    Microscopic Examination See below:    Microscopic Examination   Result Value Ref Range    SL AMB WBC, URINE 0-5 0 - 5 /hpf    RBC, Urine 11-30 (A) 0 - 2 /hpf    Epithelial Cells (non renal) 0-10 0 - 10 /hpf    Casts None seen None seen /lpf    Bacteria, Urine Few None seen/Few       Results from last 7 days   Lab Units 03/31/23  1025   WHITE BLOOD CELL COUNT  x10E3/uL 6 8   HEMOGLOBIN  g/dL 13 3   HEMATOCRIT  % 39 9   PLATELETS  [id number]/   POTASSIUM mmol/L 4 9   CHLORIDE mmol/L 104   CO2 mmol/L 23   BUN mg/dL 20   CREATININE mg/dL 0 86   MAGNESIUM mg/dL 2 0           Radiology review:   chest X-ray    Ultrasound      Portions of the record may have been created with voice recognition software  Occasional wrong word or \"sound a like\" substitutions may have occurred due to the inherent limitations of voice recognition software  Read the chart carefully and recognize, using context, where substitutions have occurred                      "

## 2023-03-29 ENCOUNTER — TELEPHONE (OUTPATIENT)
Dept: NEPHROLOGY | Facility: CLINIC | Age: 48
End: 2023-03-29

## 2023-03-29 NOTE — TELEPHONE ENCOUNTER
Spoke with patient reminding her to go for lab work and to bring her BP readings to her appt on 4/5  She expressed understanding and thanked us for the reminder call

## 2023-04-01 LAB
APPEARANCE UR: CLEAR
BACTERIA URNS QL MICRO: ABNORMAL
BILIRUB UR QL STRIP: NEGATIVE
BUN SERPL-MCNC: 20 MG/DL (ref 6–24)
BUN/CREAT SERPL: 23 (ref 9–23)
CALCIUM SERPL-MCNC: 9.3 MG/DL (ref 8.7–10.2)
CASTS URNS QL MICRO: ABNORMAL /LPF
CHLORIDE SERPL-SCNC: 104 MMOL/L (ref 96–106)
CO2 SERPL-SCNC: 23 MMOL/L (ref 20–29)
COLOR UR: YELLOW
CREAT SERPL-MCNC: 0.86 MG/DL (ref 0.57–1)
CREAT UR-MCNC: 146.4 MG/DL
EGFR: 84 ML/MIN/1.73
EPI CELLS #/AREA URNS HPF: ABNORMAL /HPF (ref 0–10)
ERYTHROCYTE [DISTWIDTH] IN BLOOD BY AUTOMATED COUNT: 13 % (ref 11.7–15.4)
GLUCOSE SERPL-MCNC: 96 MG/DL (ref 70–99)
GLUCOSE UR QL: NEGATIVE
HCT VFR BLD AUTO: 39.9 % (ref 34–46.6)
HGB BLD-MCNC: 13.3 G/DL (ref 11.1–15.9)
HGB UR QL STRIP: ABNORMAL
KETONES UR QL STRIP: NEGATIVE
LEUKOCYTE ESTERASE UR QL STRIP: NEGATIVE
MAGNESIUM SERPL-MCNC: 2 MG/DL (ref 1.6–2.3)
MCH RBC QN AUTO: 29.3 PG (ref 26.6–33)
MCHC RBC AUTO-ENTMCNC: 33.3 G/DL (ref 31.5–35.7)
MCV RBC AUTO: 88 FL (ref 79–97)
MICRO URNS: ABNORMAL
NITRITE UR QL STRIP: NEGATIVE
PH UR STRIP: 5 [PH] (ref 5–7.5)
PHOSPHATE SERPL-MCNC: 2.9 MG/DL (ref 3–4.3)
PLATELET # BLD AUTO: 244 X10E3/UL (ref 150–450)
POTASSIUM SERPL-SCNC: 4.9 MMOL/L (ref 3.5–5.2)
PROT UR QL STRIP: ABNORMAL
PROT UR-MCNC: 103.8 MG/DL
PROT/CREAT UR: 709 MG/G CREAT (ref 0–200)
PTH-INTACT SERPL-MCNC: 28 PG/ML (ref 15–65)
RBC # BLD AUTO: 4.54 X10E6/UL (ref 3.77–5.28)
RBC #/AREA URNS HPF: ABNORMAL /HPF (ref 0–2)
SODIUM SERPL-SCNC: 139 MMOL/L (ref 134–144)
SP GR UR: 1.02 (ref 1–1.03)
UROBILINOGEN UR STRIP-ACNC: 0.2 MG/DL (ref 0.2–1)
WBC # BLD AUTO: 6.8 X10E3/UL (ref 3.4–10.8)
WBC #/AREA URNS HPF: ABNORMAL /HPF (ref 0–5)

## 2023-04-05 ENCOUNTER — OFFICE VISIT (OUTPATIENT)
Dept: NEPHROLOGY | Facility: CLINIC | Age: 48
End: 2023-04-05

## 2023-04-05 VITALS — HEIGHT: 63 IN | WEIGHT: 185.8 LBS | BODY MASS INDEX: 32.92 KG/M2

## 2023-04-05 DIAGNOSIS — R80.1 PERSISTENT PROTEINURIA: ICD-10-CM

## 2023-04-05 DIAGNOSIS — N18.1 STAGE 1 CHRONIC KIDNEY DISEASE: Primary | ICD-10-CM

## 2023-04-05 DIAGNOSIS — R31.29 MICROSCOPIC HEMATURIA: ICD-10-CM

## 2023-04-05 DIAGNOSIS — E55.9 VITAMIN D DEFICIENCY: ICD-10-CM

## 2023-04-05 RX ORDER — LISINOPRIL 5 MG/1
5 TABLET ORAL DAILY
Qty: 30 TABLET | Refills: 5 | Status: SHIPPED | OUTPATIENT
Start: 2023-04-05

## 2023-04-05 NOTE — LETTER
April 5, 2023     [address] [place] [address]    Patient: Aline Jones   YOB: 1975   Date of Visit: 4/5/2023       Dear Dr Charity Mayo:    Thank you for referring Aline Jones to me for evaluation  Below are my notes for this consultation  If you have questions, please do not hesitate to call me  I look forward to following your patient along with you  Sincerely,        Kristen Betancourt MD        CC: No Recipients  Kristen Betancourt MD  4/5/2023 10:27 AM  Sign when Signing Visit  RENAL FOLLOW UP NOTE: td    • ASSESSMENT AND PLAN:  40y o  year old female with a history of mild obesity/vitamin-D deficiency we are asked to see regarding grow albuminuria:        1  CKD stage 1    • Etiology:  Possible glomerular process/vasculitis given microscopic hematuria and proteinuria/micro albuminuria   Of note patient with iritis, no overt uveitis by history however she does use steroid drops occasional oral steroids  ? RUBEN  ? IgA nephropathy with proteinuria microscopic hematuria  • Baseline creatinine:1 0  Recommend:  • Workup:  ? Creatinine 0 86 at baseline  ? Electrolytes normal  ? MBD: All normal phosphorus just a tiny bit low at 2 9  ? Normal CBC with a hemoglobin of  13 3  ? Creatinine clearance 123 mL/minute  ? INR normal  ? Lipid profile: /HDL 46/triglycerides 73: I would simply recommend diet and exercise and weight loss as appropriate:  Patient without a strong family history of heart disease and personally has no heart disease or any other vascular disease  Ideally would like it closer to 100 given question of chronic kidney disease  ? Serologies:  ESR 7/RPR nonreactive/hepatitis C normal/IgA negative/SPEP/UPEP negative/DARIUS and double-stranded DNA negative/ANCA negative/C3/C4 normal/ESR and CRP both normal/ASO negative/RPR negative  ? UA with microscopic: 2+ blood/negative leukocyte/2+ proteinuria; 11- 30 RBCs  ?  Urine protein creatinine ratio  709 mg still at goal  ? Renal ultrasound with PVR:Normal: Normal           • Treatment:  ? Treat hypertension, please see below for recommendations  ? Treat dyslipidemia  ? Avoid nephrotoxic agents such as NSAIDs, and proton pump inhibitors as able; patient counseled as such  ? Good overall health recommendations including weight loss as appropriate, isotonic exercise as able, and avoidance of salt; patient counseled as such  ? Seen by Dr Renato Ross who will perform a cystoscopy to complete microscopic hematuria urologic workup  ? I would perform close observation and if her proteinuria approaches 1 g I would definitely pursue kidney biopsy at this time  Patient potentially has IgA nephropathy  ? I would actually add an ACE inhibitor please see below     Further workup and treatment recommendations will be forthcoming depending upon the above results and course     2  Hypertension:  • Goal:  Less than 130/80-possibly less 125/75 given CKD; may be lower based on proteinuria     Current blood pressure readings:  Pending        • Push nonmedical regimen as outlined above  • Potential use of an ACE-inhibitor/ARB given proteinuria  • Medication changes today:   • I will add lisinopril 5 mg daily in the morning  • Recheck labs 1 week later and recheck blood pressures 2 to 3 weeks after initiation     3  Other problems:  • Mild Obesity  • Vitamin-D deficiency   33 8 from April 27, 2022 continue current vitamin-D supplement       PATIENT INSTRUCTIONS:    Patient Instructions   1  Medication changes today:  • Please begin lisinopril 5 mg daily in the morning  Potential side effects include a cough, and very rarely throat closing almost like an anaphylactic reaction  • Continue a low-salt diet    2  Please go for non fasting  lab work 1-2 weeks after making the above medication change      3   Please take 1 week a blood pressure readings 3 to 4 weeks after making the above medication change    AS FOLLOWS  MORNING AND EVENING, SITTING AND STANDING as follows:  · TAKE THE MORNING READINGS BEFORE ANY MEDICATIONS AND WHEN YOU ARE RELAXED FOR SEVERAL MINUTES  · TAKE THE EVENING READINGS:  BETWEEN 7-10 P M ; PRIOR TO ANY MEDICATIONS; AT LEAST IN OUR  FROM DINNER; AND CERTAINLY AFTER RELAXING FOR A FEW MINUTES  · PLEASE INCLUDE HEART RATE WITH YOUR BLOOD PRESSURE READINGS  · When taking standing readings, keep your arm supported at heart level and not dangling  · Make sure you are sitting with your back supported and feet on the ground and do not cross your legs or feet  · Make sure you have not taken any coffee or caffeine products or exercised or smoke cigarettes at least 30 minutes before taking your blood pressure  Then please mail these readings into the office    4  In 3 months:  • Please go for nonfasting lab work but in the morning  • Please take 1 week a blood pressure readings as outlined above and mail those into the office      5  Follow-up in 6 months  • Please bring in 1 week a blood pressure readings morning evening, sitting and standing is outlined above  • PLEASE BRING AN YOUR BLOOD PRESSURE MACHINE TO CORRELATE WITH THE OFFICE MACHINE AT THIS NEXT SCHEDULED VISIT  • Please go for fasting lab work 1-2 weeks prior to your appointment      6  General instructions:  • AVOID SALT BUT NOT ADDING AN READING LABELS TO MAKE SURE THERE IS LOW-SALT IN THE FOOD THAT YOU ARE EATING  o Goal is less than 2 g of sodium intake or less than 5 g of sodium chloride intake per day    • Avoid nonsteroidal anti-inflammatory drugs such as Naprosyn, ibuprofen, Aleve, Advil, Celebrex, Meloxicam (Mobic) etc   You can use Tylenol as needed if you do not have any liver condition to be concerned about    • Avoid medications such as Sudafed or decongestants and antihistamines that contained the D component which is the decongestant  You can take antihistamines without the decongestant or D component      • Try to avoid medications such as pantoprazole or  Protonix/Nexium or Esomeprazole)/Prilosec or omeprazole/Prevacid or lansoprazole/AcipHex or Rabeprazole  If you are able to, use Pepcid as this is safer for your kidneys  • Try to exercise at least 30 minutes 3 days a week to begin with with an ultimate goal of 5 days a week for at least 30 minutes    • Try to lose 5-10 lb by your next visit    • Please do not drink more than 2 glasses of alcohol/wine on a daily basis as this may contribute to your high blood pressure  Subjective: There has been no hospitalizations or acute illnesses since last visit  The patient overall is feeling well  No fevers, chills, or cough or colds  Good appetite and good energy  No hematuria, dysuria, voiding symptoms or foamy urine  No gastrointestinal symptoms  No cardiovascular symptoms including swelling of the legs  No headaches, dizziness or lightheadedness  Blood pressure medications:  • None at this time      ROS:  See HPI, otherwise review of systems as completely reviewed with the patient are negative    Past Medical History:   Diagnosis Date   • Chronic kidney disease 3-21     Past Surgical History:   Procedure Laterality Date   • HIP SURGERY      Right Hip repair/realignment surgery     Family History   Problem Relation Age of Onset   • Hyperlipidemia Mother    • Lung cancer Father    • Cancer Father       reports that she has never smoked  She has never used smokeless tobacco  She reports current alcohol use  She reports that she does not use drugs      I COMPLETELY REVIEWED THE PAST MEDICAL HISTORY/PAST SURGICAL HISTORY/SOCIAL HISTORY/FAMILY HISTORY/AND MEDICATIONS  AND UPDATED ALL    Objective:     Vitals:   BP sitting on right: 120/70 with a heart of 64 and regular same on left  BP standing on left: 122/78 with a heart rate of 68 and regular    Weight (last 2 days)     Date/Time Weight    04/05/23 0956 84 3 (185 8)        Wt Readings from Last 3 Encounters:   04/05/23 84 3 kg (185 lb 12 8 oz)   02/28/23 84 kg (185 lb 4 oz)   10/03/22 82 1 kg (181 lb)       Body mass index is 32 91 kg/m²  Physical Exam: General:  No acute distress  Skin:  No acute rash  Eyes:  No scleral icterus, noninjected, no discharge from eyes  ENT:  Moist mucous membranes  Neck:  Supple, no jugular venous distention, trachea is midline, no lymphadenopathy and no thyromegaly  Back   No CVAT  Chest:  Clear to auscultation and percussion, good respiratory effort  CVS:  Regular rate and rhythm without a rub, or gallops or murmurs  Abdomen:  Soft and nontender with normal bowel sounds  Extremities:  No cyanosis and no edema, no arthritic changes, normal range of motion  Neuro:  Grossly intact  Psych:  Alert, oriented x3 and appropriate      Medications:    Current Outpatient Medications:   •  lisinopril (ZESTRIL) 5 mg tablet, Take 1 tablet (5 mg total) by mouth daily, Disp: 30 tablet, Rfl: 5  •  loratadine-pseudoephedrine (CLARITIN-D 12-HOUR) 5-120 mg per tablet, Take 1 tablet by mouth daily as needed for allergies, Disp: 30 tablet, Rfl: 0  •  meclizine (ANTIVERT) 25 mg tablet, Take 1 tablet (25 mg total) by mouth 3 (three) times a day as needed for dizziness, Disp: 30 tablet, Rfl: 0  •  methylPREDNISolone 4 MG tablet therapy pack, Use as directed on package (Patient not taking: Reported on 4/5/2023), Disp: 21 each, Rfl: 0    Lab, Imaging and other studies: I have personally reviewed pertinent labs    Laboratory Results:  Results for orders placed or performed in visit on 45/97/39   Basic metabolic panel   Result Value Ref Range    Glucose, Random 96 70 - 99 mg/dL    BUN 20 6 - 24 mg/dL    Creatinine 0 86 0 57 - 1 00 mg/dL    eGFR 84 >59 mL/min/1 73    SL AMB BUN/CREATININE RATIO 23 9 - 23    Sodium 139 134 - 144 mmol/L    Potassium 4 9 3 5 - 5 2 mmol/L    Chloride 104 96 - 106 mmol/L    CO2 23 20 - 29 mmol/L    CALCIUM 9 3 8 7 - 10 2 mg/dL   Magnesium   Result Value Ref Range    Magnesium, Serum 2 0 1 6 - 2 3 mg/dL   Phosphorus "  Result Value Ref Range    Phosphorus, Serum 2 9 (L) 3 0 - 4 3 mg/dL   CBC   Result Value Ref Range    White Blood Cell Count 6 8 3 4 - 10 8 x10E3/uL    Red Blood Cell Count 4 54 3 77 - 5 28 x10E6/uL    Hemoglobin 13 3 11 1 - 15 9 g/dL    HCT 39 9 34 0 - 46 6 %    MCV 88 79 - 97 fL    MCH 29 3 26 6 - 33 0 pg    MCHC 33 3 31 5 - 35 7 g/dL    RDW 13 0 11 7 - 15 4 %    Platelet Count 601 146 - 450 x10E3/uL   Protein / creatinine ratio, urine   Result Value Ref Range    Creatinine, Urine 146 4 Not Estab  mg/dL    Total Protein, Urine 103 8 Not Estab  mg/dL    Prot/Creat Ratio, Ur 709 (H) 0 - 200 mg/g creat   PTH, intact   Result Value Ref Range    PTH, Intact 28 15 - 65 pg/mL   Urinalysis with microscopic   Result Value Ref Range    Specific Gravity 1 024 1 005 - 1 030    Ph 5 0 5 0 - 7 5    Color UA Yellow Yellow    Urine Appearance Clear Clear    Leukocyte Esterase Negative Negative    Protein 2+ (A) Negative/Trace    Glucose, 24 HR Urine Negative Negative    Ketone, Urine Negative Negative    Blood, Urine 2+ (A) Negative    Bilirubin, Urine Negative Negative    Urobilinogen Urine 0 2 0 2 - 1 0 mg/dL    SL AMB NITRITES URINE, QUAL  Negative Negative    Microscopic Examination See below:    Microscopic Examination   Result Value Ref Range    SL AMB WBC, URINE 0-5 0 - 5 /hpf    RBC, Urine 11-30 (A) 0 - 2 /hpf    Epithelial Cells (non renal) 0-10 0 - 10 /hpf    Casts None seen None seen /lpf    Bacteria, Urine Few None seen/Few       Results from last 7 days   Lab Units 03/31/23  1025   WHITE BLOOD CELL COUNT  x10E3/uL 6 8   HEMOGLOBIN  g/dL 13 3   HEMATOCRIT  % 39 9   PLATELETS  Y26A7/   POTASSIUM mmol/L 4 9   CHLORIDE mmol/L 104   CO2 mmol/L 23   BUN mg/dL 20   CREATININE mg/dL 0 86   MAGNESIUM mg/dL 2 0         Radiology review:   chest X-ray    Ultrasound      Portions of the record may have been created with voice recognition software    Occasional wrong word or \"sound a like\" substitutions may have occurred " due to the inherent limitations of voice recognition software  Read the chart carefully and recognize, using context, where substitutions have occurred

## 2023-04-05 NOTE — PATIENT INSTRUCTIONS
1  Medication changes today:  Please begin lisinopril 5 mg daily in the morning  Potential side effects include a cough, and very rarely throat closing almost like an anaphylactic reaction  Continue a low-salt diet    2  Please go for non fasting  lab work 1-2 weeks after making the above medication change  3   Please take 1 week a blood pressure readings 3 to 4 weeks after making the above medication change    AS FOLLOWS  MORNING AND EVENING, SITTING AND STANDING as follows:  TAKE THE MORNING READINGS BEFORE ANY MEDICATIONS AND WHEN YOU ARE RELAXED FOR SEVERAL MINUTES  TAKE THE EVENING READINGS:  BETWEEN 7-10 P M ; PRIOR TO ANY MEDICATIONS; AT LEAST IN OUR  FROM DINNER; AND CERTAINLY AFTER RELAXING FOR A FEW MINUTES  PLEASE INCLUDE HEART RATE WITH YOUR BLOOD PRESSURE READINGS  When taking standing readings, keep your arm supported at heart level and not dangling  Make sure you are sitting with your back supported and feet on the ground and do not cross your legs or feet  Make sure you have not taken any coffee or caffeine products or exercised or smoke cigarettes at least 30 minutes before taking your blood pressure  Then please mail these readings into the office    4  In 3 months:  Please go for nonfasting lab work but in the morning  Please take 1 week a blood pressure readings as outlined above and mail those into the office      5  Follow-up in 6 months  Please bring in 1 week a blood pressure readings morning evening, sitting and standing is outlined above  PLEASE BRING AN YOUR BLOOD PRESSURE MACHINE TO CORRELATE WITH THE OFFICE MACHINE AT THIS NEXT SCHEDULED VISIT  Please go for fasting lab work 1-2 weeks prior to your appointment      6    General instructions:  AVOID SALT BUT NOT ADDING AN READING LABELS TO MAKE SURE THERE IS LOW-SALT IN THE FOOD THAT YOU ARE EATING  Goal is less than 2 g of sodium intake or less than 5 g of sodium chloride intake per day    Avoid nonsteroidal anti-inflammatory drugs such as Naprosyn, ibuprofen, Aleve, Advil, Celebrex, Meloxicam (Mobic) etc   You can use Tylenol as needed if you do not have any liver condition to be concerned about    Avoid medications such as Sudafed or decongestants and antihistamines that contained the D component which is the decongestant  You can take antihistamines without the decongestant or D component  Try to avoid medications such as pantoprazole or  Protonix/Nexium or Esomeprazole)/Prilosec or omeprazole/Prevacid or lansoprazole/AcipHex or Rabeprazole  If you are able to, use Pepcid as this is safer for your kidneys  Try to exercise at least 30 minutes 3 days a week to begin with with an ultimate goal of 5 days a week for at least 30 minutes    Try to lose 5-10 lb by your next visit    Please do not drink more than 2 glasses of alcohol/wine on a daily basis as this may contribute to your high blood pressure

## 2023-08-09 DIAGNOSIS — R80.1 PERSISTENT PROTEINURIA: ICD-10-CM

## 2023-08-09 DIAGNOSIS — E55.9 VITAMIN D DEFICIENCY: ICD-10-CM

## 2023-08-09 DIAGNOSIS — R31.29 MICROSCOPIC HEMATURIA: ICD-10-CM

## 2023-08-09 DIAGNOSIS — N18.1 STAGE 1 CHRONIC KIDNEY DISEASE: ICD-10-CM

## 2023-08-09 RX ORDER — LISINOPRIL 5 MG/1
5 TABLET ORAL DAILY
Qty: 90 TABLET | Refills: 3 | Status: SHIPPED | OUTPATIENT
Start: 2023-08-09

## 2023-10-03 ENCOUNTER — ANNUAL EXAM (OUTPATIENT)
Dept: OBGYN CLINIC | Facility: CLINIC | Age: 48
End: 2023-10-03
Payer: COMMERCIAL

## 2023-10-03 VITALS — DIASTOLIC BLOOD PRESSURE: 64 MMHG | SYSTOLIC BLOOD PRESSURE: 118 MMHG

## 2023-10-03 DIAGNOSIS — Z12.31 ENCOUNTER FOR SCREENING MAMMOGRAM FOR MALIGNANT NEOPLASM OF BREAST: ICD-10-CM

## 2023-10-03 DIAGNOSIS — Z01.419 WELL WOMAN EXAM WITH ROUTINE GYNECOLOGICAL EXAM: Primary | ICD-10-CM

## 2023-10-03 DIAGNOSIS — Z11.51 SCREENING FOR HPV (HUMAN PAPILLOMAVIRUS): ICD-10-CM

## 2023-10-03 DIAGNOSIS — Z12.4 ENCOUNTER FOR SCREENING FOR MALIGNANT NEOPLASM OF CERVIX: ICD-10-CM

## 2023-10-03 PROCEDURE — G0145 SCR C/V CYTO,THINLAYER,RESCR: HCPCS | Performed by: PHYSICIAN ASSISTANT

## 2023-10-03 PROCEDURE — S0610 ANNUAL GYNECOLOGICAL EXAMINA: HCPCS | Performed by: PHYSICIAN ASSISTANT

## 2023-10-03 PROCEDURE — G0476 HPV COMBO ASSAY CA SCREEN: HCPCS | Performed by: PHYSICIAN ASSISTANT

## 2023-10-03 NOTE — PROGRESS NOTES
ASSESSMENT & PLAN:   Diagnoses and all orders for this visit:    Well woman exam with routine gynecological exam  -     Liquid-based pap, screening    Encounter for screening for malignant neoplasm of cervix  -     Liquid-based pap, screening    Screening for HPV (human papillomavirus)  -     Liquid-based pap, screening    Encounter for screening mammogram for malignant neoplasm of breast          The following were reviewed in today's visit: ASCCP guidelines, Gardisil vaccination, STD testing breast self exam, mammography screening ordered, STD testing, exercise, healthy diet and colonoscopy discussed and ordered. Patient to return to office in yearly for annual exam.     All questions have been answered to her satisfaction. CC:  Annual Gynecologic Examination  Chief Complaint   Patient presents with   • Gynecologic Exam     Annual exam, last pap not on file. Pt is well, states there are no concerns. Mammo 3/8/2023-Next mammo scheduled 03/24/2024        HPI: Prudencio Shelton is a 53 y.o. [id number] who presents for annual gynecologic examination. She has the following concerns:  None at this time. Health Maintenance:    Exercise: intermittently  Breast exams/breast awareness: yes  Last mammogram: 2023    Past Medical History:   Diagnosis Date   • Chronic kidney disease 4-19       Past Surgical History:   Procedure Laterality Date   • HIP SURGERY      Right Hip repair/realignment surgery       Past OB/Gyn History:  Period Cycle (Days): 28  Period Duration (Days): 4-5  Period Pattern: Regular  Menstrual Flow: Heavy, Moderate, Light  Menstrual Control: Maxi pad, Thin pad, Panty linerPatient's last menstrual period was 09/28/2023 (approximate). Last Pap: few years ago : no abnormalities  History of abnormal Pap smear: No  HPV vaccine completed: no    Patient is currently sexually active.    STD testing: no  Current contraception: none      Family History  Family History   Problem Relation Age of Onset   • Hyperlipidemia Mother    • Hypertension Mother    • Lung cancer Father    • Cancer Father    • Heart attack Maternal Grandfather    • Lung cancer Paternal Grandfather        Family history of uterine or ovarian cancer: no  Family history of breast cancer: no  Family history of colon cancer: no    Social History:  Social History     Socioeconomic History   • Marital status: /Civil Union     Spouse name: Not on file   • Number of children: Not on file   • Years of education: Not on file   • Highest education level: Not on file   Occupational History   • Not on file   Tobacco Use   • Smoking status: Never   • Smokeless tobacco: Never   Vaping Use   • Vaping Use: Never used   Substance and Sexual Activity   • Alcohol use: Yes     Comment: Rare consumption   • Drug use: Never   • Sexual activity: Yes     Partners: Male     Birth control/protection: Male Sterilization   Other Topics Concern   • Not on file   Social History Narrative    Most recent tobacco use screenin2018     Social Determinants of Health     Financial Resource Strain: Not on file   Food Insecurity: Not on file   Transportation Needs: Not on file   Physical Activity: Not on file   Stress: Not on file   Social Connections: Not on file   Intimate Partner Violence: Not on file   Housing Stability: Not on file     Domestic violence screen: negative    Allergies:  No Known Allergies    Medications:    Current Outpatient Medications:   •  lisinopril (ZESTRIL) 5 mg tablet, Take 1 tablet (5 mg total) by mouth daily, Disp: 90 tablet, Rfl: 3    Review of Systems:  Review of Systems   Constitutional: Negative for chills, fever and unexpected weight change. Respiratory: Negative for shortness of breath. Cardiovascular: Negative for chest pain. Gastrointestinal: Negative for abdominal pain, diarrhea, nausea and vomiting. Skin: Negative for rash. Psychiatric/Behavioral: Negative for dysphoric mood. The patient is not nervous/anxious. Physical Exam:  /64 (BP Location: Left arm, Patient Position: Sitting, Cuff Size: Standard)   LMP 09/28/2023 (Approximate)    Physical Exam  Constitutional:       Appearance: Normal appearance. Genitourinary:      Vulva and urethral meatus normal.      No lesions in the vagina. Right Labia: No rash or lesions. Left Labia: No lesions or rash. No vaginal discharge, erythema or bleeding. Right Adnexa: not tender and no mass present. Left Adnexa: not tender and no mass present. No cervical discharge or lesion. Uterus is not tender. Breasts:     Breasts are symmetrical.      Right: No mass or skin change. Left: No mass or skin change. HENT:      Head: Normocephalic and atraumatic. Cardiovascular:      Rate and Rhythm: Normal rate and regular rhythm. Heart sounds: Normal heart sounds. No murmur heard. No friction rub. No gallop. Pulmonary:      Effort: Pulmonary effort is normal.      Breath sounds: Normal breath sounds. No wheezing, rhonchi or rales. Abdominal:      General: Abdomen is flat. There is no distension. Palpations: Abdomen is soft. Tenderness: There is no abdominal tenderness. Musculoskeletal:      Cervical back: Neck supple. Lymphadenopathy:      Upper Body:      Right upper body: No axillary adenopathy. Left upper body: No axillary adenopathy. Neurological:      General: No focal deficit present. Mental Status: She is alert. Skin:     General: Skin is warm and dry. Psychiatric:         Mood and Affect: Mood normal.         Behavior: Behavior normal.   Vitals reviewed.

## 2023-10-05 LAB
HPV HR 12 DNA CVX QL NAA+PROBE: NEGATIVE
HPV16 DNA CVX QL NAA+PROBE: NEGATIVE
HPV18 DNA CVX QL NAA+PROBE: NEGATIVE

## 2023-10-06 LAB
LAB AP GYN PRIMARY INTERPRETATION: NORMAL
Lab: NORMAL

## 2023-11-13 ENCOUNTER — TELEPHONE (OUTPATIENT)
Dept: NEPHROLOGY | Facility: CLINIC | Age: 48
End: 2023-11-13

## 2023-11-13 NOTE — TELEPHONE ENCOUNTER
Spoke with patient reminding her to go for lab work but patient had to cancel due to being out of town. Unable to reschedule. Patient on waitlist and recall to be rescheduled.

## 2024-01-08 NOTE — PROGRESS NOTES
RENAL FOLLOW UP NOTE:.td    ASSESSMENT AND PLAN:  48 y.o. year old female with a history of mild obesity/vitamin-D deficiency we are asked to see regarding grow albuminuria:        1. CKD stage 1.   Etiology:  Possible glomerular process/vasculitis given microscopic hematuria and proteinuria/micro albuminuria.  Of note patient with iritis, no overt uveitis by history however she does use steroid drops occasional oral steroids.? RUBEN.?  IgA nephropathy with proteinuria microscopic hematuria  Baseline creatinine:1.0  Recommend:  Workup:  Creatinine 1.00 at baseline  Electrolytes normal including potassium of 4.6  MBD: All normal   Normal CBC with a hemoglobin of  13.0  Creatinine clearance 123 mL/minute  INR normal  Lipid profile: /HDL 49/triglycerides 110 improved!  I would simply recommend diet and exercise and weight loss as appropriate:  Patient without a strong family history of heart disease and personally has no heart disease or any other vascular disease.  Ideally would like it closer to 100 given question of chronic kidney disease.  Serologies:  ESR 7/RPR nonreactive/hepatitis C normal/IgA negative/SPEP/UPEP negative/DARIUS and double-stranded DNA negative/ANCA negative/C3/C4 normal/ESR and CRP both normal/ASO negative/RPR negative  UA with microscopic: 2+ heme/unchanged proteinuria/3-10 RBC 0-5 WBCs  Urine protein creatinine ratio 542 mg still at goal  Renal ultrasound with PVR:Normal: Normal           Treatment:  Treat hypertension, please see below for recommendations  Treat dyslipidemia  Avoid nephrotoxic agents such as NSAIDs, and proton pump inhibitors as able; patient counseled as such  Good overall health recommendations including weight loss as appropriate, isotonic exercise as able, and avoidance of salt; patient counseled as such  Seen by Dr. Cantrell and she had a negative cystoscopy 7/13/2022  I would perform close observation and if her proteinuria approaches 1 g I would definitely pursue  kidney biopsy at this time.  Patient potentially has IgA nephropathy.  ACE inhibitor     Further workup and treatment recommendations will be forthcoming depending upon the above results and course     2. Hypertension:  Goal:  Less than 130/80-possibly less 125/75 given CKD; may be lower based on proteinuria     Current blood pressure readings:  None today        Push nonmedical regimen as outlined above    Medication changes today:   No changes today patient's blood pressure is low normal but tolerating  Adjust lisinopril as needed best agent in regards to the IgA nephropathy potential     3. Other problems:  Mild Obesity  Vitamin-D deficiency   33.8 from April 27, 2022 continue current vitamin-D supplement      GI health maintenance: Last colonoscopy: None done at this time I will refer given new age recommendation of 46 years old.    PATIENT INSTRUCTIONS:    Patient Instructions   Visit summary:  - Overall labs look really good your protein is at goal which is about 542 mg we want to keep you under 1000 mg.  - Blood pressure seems really good but if you could send in a week of blood pressures that would be great  - I would also recommend trying fish oil 1 g twice a day if you do have IgA nephropathy this potentially may help try to place the pills in the refrigerator or freezer sometimes more tolerable    Finally I will make referral for a colonoscopy for screening for colon cancer the new age is 46-year-old recommendation for colonoscopy        1. Medication changes today:  No medication changes today    2.  General instructions:  Continue exercising, continue to avoid salt      3.  Please take 1 week a blood pressure readings at this time    AS FOLLOWS  MORNING AND EVENING, SITTING  as follows:  TAKE THE MORNING READINGS BEFORE ANY MEDICATIONS AND WHEN YOU ARE RELAXED FOR SEVERAL MINUTES  TAKE THE EVENING READINGS:  BETWEEN 7-10 P.M.; PRIOR TO ANY MEDICATIONS; AT LEAST IN OUR  FROM DINNER; AND  CERTAINLY AFTER RELAXING FOR A FEW MINUTES  PLEASE INCLUDE HEART RATE WITH YOUR BLOOD PRESSURE READINGS  When taking standing readings, keep your arm supported at heart level and not dangling  Make sure you are sitting with your back supported and feet on the ground and do not cross your legs or feet  Make sure you have not taken any coffee or caffeine products or exercised or smoke cigarettes at least 30 minutes before taking your blood pressure  Then please mail these readings into the office    4.In 3 months:  Please go for nonfasting lab work but in the morning  Please take 1 week a blood pressure readings as outlined above and mail those into the office    5.  Follow-up in 6 months  Please bring in 1 week a blood pressure readings morning evening, sitting and standing is outlined above  PLEASE BRING AN YOUR BLOOD PRESSURE MACHINE TO CORRELATE WITH THE OFFICE MACHINE AT THIS NEXT SCHEDULED VISIT  Please go for fasting lab work 1-2 weeks prior to your appointment      6.  In the General non medical recommendations:  AVOID SALT BUT NOT ADDING AN READING LABELS TO MAKE SURE THERE IS LOW-SALT IN THE FOOD THAT YOU ARE EATING  Goal is less than 2 g of sodium intake or less than 5 g of sodium chloride intake per day    Avoid nonsteroidal anti-inflammatory drugs such as Naprosyn, ibuprofen, Aleve, Advil, Celebrex, Meloxicam (Mobic) etc.  You can use Tylenol as needed if you do not have any liver condition to be concerned about    Avoid medications such as Sudafed or decongestants and antihistamines that contained the D component which is the decongestant.  You can take antihistamines without the decongestant or D component.    Try to avoid medications such as pantoprazole or  Protonix/Nexium or Esomeprazole)/Prilosec or omeprazole/Prevacid or lansoprazole/AcipHex or Rabeprazole.  If you are able to, use Pepcid as this is safer for your kidneys.    Try to exercise at least 30 minutes 3 days a week to begin with with an  ultimate goal of 5 days a week for at least 30 minutes    Please do not drink more than 2 glasses of alcohol/wine on a daily basis as this may contribute to your high blood pressure.            Subjective:   There has been no hospitalizations or acute illnesses since last visit.  The patient overall is feeling well.  No fevers, chills, or cough or colds.  Good appetite and good energy  No hematuria, dysuria, voiding symptoms or foamy urine  No gastrointestinal symptoms  No cardiovascular symptoms including swelling of the legs  No headaches, dizziness or lightheadedness  Blood pressure medications:  Lisinopril 5 mg daily    Renal pertinent medications:  None    ROS:  See HPI, otherwise review of systems as completely reviewed with the patient are negative    Past Medical History:   Diagnosis Date    Chronic kidney disease 4-19     Past Surgical History:   Procedure Laterality Date    HIP SURGERY      Right Hip repair/realignment surgery     Family History   Problem Relation Age of Onset    Hyperlipidemia Mother     Hypertension Mother     Lung cancer Father     Cancer Father     Heart attack Maternal Grandfather     Lung cancer Paternal Grandfather       reports that she has never smoked. She has never used smokeless tobacco. She reports current alcohol use. She reports that she does not use drugs.    I COMPLETELY REVIEWED THE PAST MEDICAL HISTORY/PAST SURGICAL HISTORY/SOCIAL HISTORY/FAMILY HISTORY/AND MEDICATIONS  AND UPDATED ALL    Objective:     Vitals:   BP sitting on left: 104/70 with a heart rate of 68 and regular  BP standing on left: 100/70 with a heart rate of 80 and regular  Vitals:    01/18/24 0727   BP: 102/65   Pulse: 74       Weight (last 2 days)       Date/Time Weight    01/18/24 0727 85.6 (188.8)          Wt Readings from Last 3 Encounters:   01/18/24 85.6 kg (188 lb 12.8 oz)   04/05/23 84.3 kg (185 lb 12.8 oz)   02/28/23 84 kg (185 lb 4 oz)       Body mass index is 33.44 kg/m².    Physical Exam:  General:  No acute distress  Skin:  No acute rash  Eyes:  No scleral icterus, noninjected, no discharge from eyes  ENT:  Moist mucous membranes  Neck:  Supple, no jugular venous distention, trachea is midline, no lymphadenopathy and no thyromegaly  Back   No CVAT  Chest:  Clear to auscultation and percussion, good respiratory effort  CVS:  Regular rate and rhythm without a rub, or gallops or murmurs  Abdomen:  Soft and nontender with normal bowel sounds  Extremities:  No cyanosis and no edema, no arthritic changes, normal range of motion  Neuro:  Grossly intact  Psych:  Alert, oriented x3 and appropriate      Medications:    Current Outpatient Medications:     lisinopril (ZESTRIL) 5 mg tablet, Take 1 tablet (5 mg total) by mouth daily, Disp: 90 tablet, Rfl: 3    Lab, Imaging and other studies: I have personally reviewed pertinent labs.  Laboratory Results:  Results for orders placed or performed in visit on 10/03/23   HPV High Risk    Specimen: Thin-Prep Vial   Result Value Ref Range    HPV Other HR Negative Negative    HPV16 Negative Negative    HPV18 Negative Negative   Liquid-based pap, screening   Result Value Ref Range    Case Report       Gynecologic Cytology Report                       Case: [id number]                                  Authorizing Provider:  Elizabeth Sharma PA-C        Collected:           10/03/2023 1431              Ordering Location:     [facility]  Received:            10/03/2023 1431                                     Health                                                                       First Screen:          Megan Roblero, CT                                                         Specimen:    LIQUID-BASED PAP, SCREENING, Cervix, Endocervical                                          Primary Interpretation Negative for intraepithelial lesion or malignancy     Specimen Adequacy       Satisfactory for evaluation. Endocervical/transformation zone component present.  "   Additional Information       [employer]'s FDA approved ,  and ThinPrep Imaging Duo System are utilized with strict adherence to the 's instruction manual to prepare gynecologic and non-gynecologic cytology specimens for the production of ThinPrep slides as well as for gynecologic ThinPrep imaging. These processes have been validated by our laboratory and/or by the .  The Pap test is not a diagnostic procedure and should not be used as the sole means to detect cervical cancer. It is only a screening procedure to aid in the detection of cervical cancer and its precursors. Both false-negative and false-positive results have been experienced. Your patient's test result should be interpreted in this context together with the history and clinical findings.         Results from last 7 days   Lab Units 01/16/24  0856   WHITE BLOOD CELL COUNT. x10E3/uL 6.6   HEMOGLOBIN. g/dL 13.0   HEMATOCRIT. % 39.6   PLATELETS. x10E3/uL 275   POTASSIUM mmol/L 4.6   CHLORIDE mmol/L 106   CO2 mmol/L 26   BUN mg/dL 15   CREATININE mg/dL 1.00   MAGNESIUM mg/dL 1.9         Radiology review:   chest X-ray    Ultrasound      Portions of the record may have been created with voice recognition software.  Occasional wrong word or \"sound a like\" substitutions may have occurred due to the inherent limitations of voice recognition software.  Read the chart carefully and recognize, using context, where substitutions have occurred.                    "

## 2024-01-10 ENCOUNTER — PATIENT MESSAGE (OUTPATIENT)
Dept: NEPHROLOGY | Facility: CLINIC | Age: 49
End: 2024-01-10

## 2024-01-15 NOTE — PATIENT COMMUNICATION
TEX for patient reminding her to go for lab work and to bring BP readings to her appt on 1/18.  Asked her to call back if she has any questions.

## 2024-01-17 LAB
ALBUMIN SERPL-MCNC: 3.9 G/DL (ref 3.9–4.9)
ALBUMIN/GLOB SERPL: 1.5 {RATIO} (ref 1.2–2.2)
ALP SERPL-CCNC: 78 IU/L (ref 44–121)
ALT SERPL-CCNC: 11 IU/L (ref 0–32)
APPEARANCE UR: CLEAR
AST SERPL-CCNC: 17 IU/L (ref 0–40)
BACTERIA URNS QL MICRO: ABNORMAL
BILIRUB SERPL-MCNC: 0.3 MG/DL (ref 0–1.2)
BILIRUB UR QL STRIP: NEGATIVE
BUN SERPL-MCNC: 15 MG/DL (ref 6–24)
BUN/CREAT SERPL: 15 (ref 9–23)
CALCIUM SERPL-MCNC: 9.4 MG/DL (ref 8.7–10.2)
CASTS URNS QL MICRO: ABNORMAL /LPF
CHLORIDE SERPL-SCNC: 106 MMOL/L (ref 96–106)
CHOLEST SERPL-MCNC: 177 MG/DL (ref 100–199)
CO2 SERPL-SCNC: 26 MMOL/L (ref 20–29)
COLOR UR: YELLOW
CREAT SERPL-MCNC: 1 MG/DL (ref 0.57–1)
CREAT UR-MCNC: 210.9 MG/DL
EGFR: 70 ML/MIN/1.73
EPI CELLS #/AREA URNS HPF: ABNORMAL /HPF (ref 0–10)
ERYTHROCYTE [DISTWIDTH] IN BLOOD BY AUTOMATED COUNT: 12.8 % (ref 11.7–15.4)
GLOBULIN SER-MCNC: 2.6 G/DL (ref 1.5–4.5)
GLUCOSE SERPL-MCNC: 107 MG/DL (ref 70–99)
GLUCOSE UR QL: NEGATIVE
HCT VFR BLD AUTO: 39.6 % (ref 34–46.6)
HDLC SERPL-MCNC: 49 MG/DL
HGB BLD-MCNC: 13 G/DL (ref 11.1–15.9)
HGB UR QL STRIP: ABNORMAL
KETONES UR QL STRIP: NEGATIVE
LDLC SERPL CALC-MCNC: 108 MG/DL (ref 0–99)
LDLC/HDLC SERPL: 2.2 RATIO (ref 0–3.2)
LEUKOCYTE ESTERASE UR QL STRIP: NEGATIVE
MAGNESIUM SERPL-MCNC: 1.9 MG/DL (ref 1.6–2.3)
MCH RBC QN AUTO: 30 PG (ref 26.6–33)
MCHC RBC AUTO-ENTMCNC: 32.8 G/DL (ref 31.5–35.7)
MCV RBC AUTO: 91 FL (ref 79–97)
MICRO URNS: ABNORMAL
NITRITE UR QL STRIP: NEGATIVE
PH UR STRIP: 5 [PH] (ref 5–7.5)
PHOSPHATE SERPL-MCNC: 3 MG/DL (ref 3–4.3)
PLATELET # BLD AUTO: 275 X10E3/UL (ref 150–450)
POTASSIUM SERPL-SCNC: 4.6 MMOL/L (ref 3.5–5.2)
PROT SERPL-MCNC: 6.5 G/DL (ref 6–8.5)
PROT UR QL STRIP: ABNORMAL
PROT UR-MCNC: 114.4 MG/DL
PROT/CREAT UR: 542 MG/G CREAT (ref 0–200)
RBC # BLD AUTO: 4.34 X10E6/UL (ref 3.77–5.28)
RBC #/AREA URNS HPF: ABNORMAL /HPF (ref 0–2)
SL AMB VLDL CHOLESTEROL CALC: 20 MG/DL (ref 5–40)
SODIUM SERPL-SCNC: 143 MMOL/L (ref 134–144)
SP GR UR: 1.02 (ref 1–1.03)
TRIGL SERPL-MCNC: 110 MG/DL (ref 0–149)
UROBILINOGEN UR STRIP-ACNC: 0.2 MG/DL (ref 0.2–1)
WBC # BLD AUTO: 6.6 X10E3/UL (ref 3.4–10.8)
WBC #/AREA URNS HPF: ABNORMAL /HPF (ref 0–5)

## 2024-01-18 ENCOUNTER — OFFICE VISIT (OUTPATIENT)
Dept: NEPHROLOGY | Facility: CLINIC | Age: 49
End: 2024-01-18
Payer: COMMERCIAL

## 2024-01-18 VITALS
WEIGHT: 188.8 LBS | HEIGHT: 63 IN | DIASTOLIC BLOOD PRESSURE: 65 MMHG | SYSTOLIC BLOOD PRESSURE: 102 MMHG | BODY MASS INDEX: 33.45 KG/M2 | HEART RATE: 74 BPM

## 2024-01-18 DIAGNOSIS — E55.9 VITAMIN D DEFICIENCY: ICD-10-CM

## 2024-01-18 DIAGNOSIS — N18.1 STAGE 1 CHRONIC KIDNEY DISEASE: Primary | ICD-10-CM

## 2024-01-18 DIAGNOSIS — Z12.11 COLON CANCER SCREENING: ICD-10-CM

## 2024-01-18 DIAGNOSIS — R80.1 PERSISTENT PROTEINURIA: ICD-10-CM

## 2024-01-18 DIAGNOSIS — R31.29 MICROSCOPIC HEMATURIA: ICD-10-CM

## 2024-01-18 PROCEDURE — 99214 OFFICE O/P EST MOD 30 MIN: CPT | Performed by: INTERNAL MEDICINE

## 2024-01-18 NOTE — PATIENT INSTRUCTIONS
Visit summary:  - Overall labs look really good your protein is at goal which is about 542 mg we want to keep you under 1000 mg.  - Blood pressure seems really good but if you could send in a week of blood pressures that would be great  - I would also recommend trying fish oil 1 g twice a day if you do have IgA nephropathy this potentially may help try to place the pills in the refrigerator or freezer sometimes more tolerable    Finally I will make referral for a colonoscopy for screening for colon cancer the new age is 46-year-old recommendation for colonoscopy        1. Medication changes today:  No medication changes today    2.  General instructions:  Continue exercising, continue to avoid salt      3.  Please take 1 week a blood pressure readings at this time    AS FOLLOWS  MORNING AND EVENING, SITTING  as follows:  TAKE THE MORNING READINGS BEFORE ANY MEDICATIONS AND WHEN YOU ARE RELAXED FOR SEVERAL MINUTES  TAKE THE EVENING READINGS:  BETWEEN 7-10 P.M.; PRIOR TO ANY MEDICATIONS; AT LEAST IN OUR  FROM DINNER; AND CERTAINLY AFTER RELAXING FOR A FEW MINUTES  PLEASE INCLUDE HEART RATE WITH YOUR BLOOD PRESSURE READINGS  When taking standing readings, keep your arm supported at heart level and not dangling  Make sure you are sitting with your back supported and feet on the ground and do not cross your legs or feet  Make sure you have not taken any coffee or caffeine products or exercised or smoke cigarettes at least 30 minutes before taking your blood pressure  Then please mail these readings into the office    4.In 3 months:  Please go for nonfasting lab work but in the morning  Please take 1 week a blood pressure readings as outlined above and mail those into the office    5.  Follow-up in 6 months  Please bring in 1 week a blood pressure readings morning evening, sitting and standing is outlined above  PLEASE BRING AN YOUR BLOOD PRESSURE MACHINE TO CORRELATE WITH THE OFFICE MACHINE AT THIS NEXT  SCHEDULED VISIT  Please go for fasting lab work 1-2 weeks prior to your appointment      6.  In the General non medical recommendations:  AVOID SALT BUT NOT ADDING AN READING LABELS TO MAKE SURE THERE IS LOW-SALT IN THE FOOD THAT YOU ARE EATING  Goal is less than 2 g of sodium intake or less than 5 g of sodium chloride intake per day    Avoid nonsteroidal anti-inflammatory drugs such as Naprosyn, ibuprofen, Aleve, Advil, Celebrex, Meloxicam (Mobic) etc.  You can use Tylenol as needed if you do not have any liver condition to be concerned about    Avoid medications such as Sudafed or decongestants and antihistamines that contained the D component which is the decongestant.  You can take antihistamines without the decongestant or D component.    Try to avoid medications such as pantoprazole or  Protonix/Nexium or Esomeprazole)/Prilosec or omeprazole/Prevacid or lansoprazole/AcipHex or Rabeprazole.  If you are able to, use Pepcid as this is safer for your kidneys.    Try to exercise at least 30 minutes 3 days a week to begin with with an ultimate goal of 5 days a week for at least 30 minutes    Please do not drink more than 2 glasses of alcohol/wine on a daily basis as this may contribute to your high blood pressure.

## 2024-01-18 NOTE — LETTER
January 18, 2024     Reji Christianson MD  [address]  [address]  [facility] [address]    Patient: Harika Jerez   YOB: 1975   Date of Visit: 1/18/2024       Dear Dr. Christianson:    Thank you for referring Harika Jerez to me for evaluation. Below are my notes for this consultation.    If you have questions, please do not hesitate to call me. I look forward to following your patient along with you.         Sincerely,        Je Connell MD        CC: No Recipients    Je Connell MD  1/18/2024  7:55 AM  Sign when Signing Visit  RENAL FOLLOW UP NOTE:.td    ASSESSMENT AND PLAN:  48 y.o. year old female with a history of mild obesity/vitamin-D deficiency we are asked to see regarding grow albuminuria:        1. CKD stage 1.   Etiology:  Possible glomerular process/vasculitis given microscopic hematuria and proteinuria/micro albuminuria.  Of note patient with iritis, no overt uveitis by history however she does use steroid drops occasional oral steroids.? RUBEN.?  IgA nephropathy with proteinuria microscopic hematuria  Baseline creatinine:1.0  Recommend:  Workup:  Creatinine 1.00 at baseline  Electrolytes normal including potassium of 4.6  MBD: All normal   Normal CBC with a hemoglobin of  13.0  Creatinine clearance 123 mL/minute  INR normal  Lipid profile: /HDL 49/triglycerides 110 improved!  I would simply recommend diet and exercise and weight loss as appropriate:  Patient without a strong family history of heart disease and personally has no heart disease or any other vascular disease.  Ideally would like it closer to 100 given question of chronic kidney disease.  Serologies:  ESR 7/RPR nonreactive/hepatitis C normal/IgA negative/SPEP/UPEP negative/DARIUS and double-stranded DNA negative/ANCA negative/C3/C4 normal/ESR and CRP both normal/ASO negative/RPR negative  UA with microscopic: 2+ heme/unchanged proteinuria/3-10 RBC 0-5 WBCs  Urine protein creatinine ratio 542 mg still at goal  Renal  ultrasound with PVR:Normal: Normal           Treatment:  Treat hypertension, please see below for recommendations  Treat dyslipidemia  Avoid nephrotoxic agents such as NSAIDs, and proton pump inhibitors as able; patient counseled as such  Good overall health recommendations including weight loss as appropriate, isotonic exercise as able, and avoidance of salt; patient counseled as such  Seen by Dr. Cantrell and she had a negative cystoscopy 7/13/2022  I would perform close observation and if her proteinuria approaches 1 g I would definitely pursue kidney biopsy at this time.  Patient potentially has IgA nephropathy.  ACE inhibitor     Further workup and treatment recommendations will be forthcoming depending upon the above results and course     2. Hypertension:  Goal:  Less than 130/80-possibly less 125/75 given CKD; may be lower based on proteinuria     Current blood pressure readings:  None today        Push nonmedical regimen as outlined above    Medication changes today:   No changes today patient's blood pressure is low normal but tolerating  Adjust lisinopril as needed best agent in regards to the IgA nephropathy potential     3. Other problems:  Mild Obesity  Vitamin-D deficiency   33.8 from April 27, 2022 continue current vitamin-D supplement      GI health maintenance: Last colonoscopy: None done at this time I will refer given new age recommendation of 46 years old.    PATIENT INSTRUCTIONS:    Patient Instructions   Visit summary:  - Overall labs look really good your protein is at goal which is about 542 mg we want to keep you under 1000 mg.  - Blood pressure seems really good but if you could send in a week of blood pressures that would be great  - I would also recommend trying fish oil 1 g twice a day if you do have IgA nephropathy this potentially may help try to place the pills in the refrigerator or freezer sometimes more tolerable    Finally I will make referral for a colonoscopy for screening for  colon cancer the new age is 46-year-old recommendation for colonoscopy        1. Medication changes today:  No medication changes today    2.  General instructions:  Continue exercising, continue to avoid salt      3.  Please take 1 week a blood pressure readings at this time    AS FOLLOWS  MORNING AND EVENING, SITTING  as follows:  TAKE THE MORNING READINGS BEFORE ANY MEDICATIONS AND WHEN YOU ARE RELAXED FOR SEVERAL MINUTES  TAKE THE EVENING READINGS:  BETWEEN 7-10 P.M.; PRIOR TO ANY MEDICATIONS; AT LEAST IN OUR  FROM DINNER; AND CERTAINLY AFTER RELAXING FOR A FEW MINUTES  PLEASE INCLUDE HEART RATE WITH YOUR BLOOD PRESSURE READINGS  When taking standing readings, keep your arm supported at heart level and not dangling  Make sure you are sitting with your back supported and feet on the ground and do not cross your legs or feet  Make sure you have not taken any coffee or caffeine products or exercised or smoke cigarettes at least 30 minutes before taking your blood pressure  Then please mail these readings into the office    4.In 3 months:  Please go for nonfasting lab work but in the morning  Please take 1 week a blood pressure readings as outlined above and mail those into the office    5.  Follow-up in 6 months  Please bring in 1 week a blood pressure readings morning evening, sitting and standing is outlined above  PLEASE BRING AN YOUR BLOOD PRESSURE MACHINE TO CORRELATE WITH THE OFFICE MACHINE AT THIS NEXT SCHEDULED VISIT  Please go for fasting lab work 1-2 weeks prior to your appointment      6.  In the General non medical recommendations:  AVOID SALT BUT NOT ADDING AN READING LABELS TO MAKE SURE THERE IS LOW-SALT IN THE FOOD THAT YOU ARE EATING  Goal is less than 2 g of sodium intake or less than 5 g of sodium chloride intake per day    Avoid nonsteroidal anti-inflammatory drugs such as Naprosyn, ibuprofen, Aleve, Advil, Celebrex, Meloxicam (Mobic) etc.  You can use Tylenol as needed if you do not  have any liver condition to be concerned about    Avoid medications such as Sudafed or decongestants and antihistamines that contained the D component which is the decongestant.  You can take antihistamines without the decongestant or D component.    Try to avoid medications such as pantoprazole or  Protonix/Nexium or Esomeprazole)/Prilosec or omeprazole/Prevacid or lansoprazole/AcipHex or Rabeprazole.  If you are able to, use Pepcid as this is safer for your kidneys.    Try to exercise at least 30 minutes 3 days a week to begin with with an ultimate goal of 5 days a week for at least 30 minutes    Please do not drink more than 2 glasses of alcohol/wine on a daily basis as this may contribute to your high blood pressure.            Subjective:   There has been no hospitalizations or acute illnesses since last visit.  The patient overall is feeling well.  No fevers, chills, or cough or colds.  Good appetite and good energy  No hematuria, dysuria, voiding symptoms or foamy urine  No gastrointestinal symptoms  No cardiovascular symptoms including swelling of the legs  No headaches, dizziness or lightheadedness  Blood pressure medications:  Lisinopril 5 mg daily    Renal pertinent medications:  None    ROS:  See HPI, otherwise review of systems as completely reviewed with the patient are negative    Past Medical History:   Diagnosis Date   • Chronic kidney disease 4-19     Past Surgical History:   Procedure Laterality Date   • HIP SURGERY      Right Hip repair/realignment surgery     Family History   Problem Relation Age of Onset   • Hyperlipidemia Mother    • Hypertension Mother    • Lung cancer Father    • Cancer Father    • Heart attack Maternal Grandfather    • Lung cancer Paternal Grandfather       reports that she has never smoked. She has never used smokeless tobacco. She reports current alcohol use. She reports that she does not use drugs.    I COMPLETELY REVIEWED THE PAST MEDICAL HISTORY/PAST SURGICAL  HISTORY/SOCIAL HISTORY/FAMILY HISTORY/AND MEDICATIONS  AND UPDATED ALL    Objective:     Vitals:   BP sitting on left: 104/70 with a heart rate of 68 and regular  BP standing on left: 100/70 with a heart rate of 80 and regular  Vitals:    01/18/24 0727   BP: 102/65   Pulse: 74       Weight (last 2 days)       Date/Time Weight    01/18/24 0727 85.6 (188.8)          Wt Readings from Last 3 Encounters:   01/18/24 85.6 kg (188 lb 12.8 oz)   04/05/23 84.3 kg (185 lb 12.8 oz)   02/28/23 84 kg (185 lb 4 oz)       Body mass index is 33.44 kg/m².    Physical Exam: General:  No acute distress  Skin:  No acute rash  Eyes:  No scleral icterus, noninjected, no discharge from eyes  ENT:  Moist mucous membranes  Neck:  Supple, no jugular venous distention, trachea is midline, no lymphadenopathy and no thyromegaly  Back   No CVAT  Chest:  Clear to auscultation and percussion, good respiratory effort  CVS:  Regular rate and rhythm without a rub, or gallops or murmurs  Abdomen:  Soft and nontender with normal bowel sounds  Extremities:  No cyanosis and no edema, no arthritic changes, normal range of motion  Neuro:  Grossly intact  Psych:  Alert, oriented x3 and appropriate      Medications:    Current Outpatient Medications:   •  lisinopril (ZESTRIL) 5 mg tablet, Take 1 tablet (5 mg total) by mouth daily, Disp: 90 tablet, Rfl: 3    Lab, Imaging and other studies: I have personally reviewed pertinent labs.  Laboratory Results:  Results for orders placed or performed in visit on 10/03/23   HPV High Risk    Specimen: Thin-Prep Vial   Result Value Ref Range    HPV Other HR Negative Negative    HPV16 Negative Negative    HPV18 Negative Negative   Liquid-based pap, screening   Result Value Ref Range    Case Report       Gynecologic Cytology Report                       Case: [id number]                                  Authorizing Provider:  Elizabeth Sharma PA-C        Collected:           10/03/2023 1431              Ordering Location:      "[facility]  Received:            10/03/2023 1431                                     Health                                                                       First Screen:          Megan Roblero, CT                                                         Specimen:    LIQUID-BASED PAP, SCREENING, Cervix, Endocervical                                          Primary Interpretation Negative for intraepithelial lesion or malignancy     Specimen Adequacy       Satisfactory for evaluation. Endocervical/transformation zone component present.    Additional Information       [employer] FDA approved ,  and ThinPrep Imaging Duo System are utilized with strict adherence to the 's instruction manual to prepare gynecologic and non-gynecologic cytology specimens for the production of ThinPrep slides as well as for gynecologic ThinPrep imaging. These processes have been validated by our laboratory and/or by the .  The Pap test is not a diagnostic procedure and should not be used as the sole means to detect cervical cancer. It is only a screening procedure to aid in the detection of cervical cancer and its precursors. Both false-negative and false-positive results have been experienced. Your patient's test result should be interpreted in this context together with the history and clinical findings.         Results from last 7 days   Lab Units 01/16/24  0856   WHITE BLOOD CELL COUNT. x10E3/uL 6.6   HEMOGLOBIN. g/dL 13.0   HEMATOCRIT. % 39.6   PLATELETS. x10E3/uL 275   POTASSIUM mmol/L 4.6   CHLORIDE mmol/L 106   CO2 mmol/L 26   BUN mg/dL 15   CREATININE mg/dL 1.00   MAGNESIUM mg/dL 1.9         Radiology review:   chest X-ray    Ultrasound      Portions of the record may have been created with voice recognition software.  Occasional wrong word or \"sound a like\" substitutions may have occurred due to the inherent limitations of voice recognition software.  Read the chart carefully " and recognize, using context, where substitutions have occurred.

## 2024-06-25 ENCOUNTER — TELEPHONE (OUTPATIENT)
Dept: NEPHROLOGY | Facility: CLINIC | Age: 49
End: 2024-06-25

## 2024-07-11 ENCOUNTER — TELEPHONE (OUTPATIENT)
Dept: NEPHROLOGY | Facility: CLINIC | Age: 49
End: 2024-07-11

## 2024-08-01 DIAGNOSIS — Z00.6 ENCOUNTER FOR EXAMINATION FOR NORMAL COMPARISON OR CONTROL IN CLINICAL RESEARCH PROGRAM: ICD-10-CM

## 2024-08-23 ENCOUNTER — APPOINTMENT (OUTPATIENT)
Dept: LAB | Facility: CLINIC | Age: 49
End: 2024-08-23

## 2024-08-23 DIAGNOSIS — Z00.6 ENCOUNTER FOR EXAMINATION FOR NORMAL COMPARISON OR CONTROL IN CLINICAL RESEARCH PROGRAM: ICD-10-CM

## 2024-08-23 PROCEDURE — 36415 COLL VENOUS BLD VENIPUNCTURE: CPT

## 2024-10-02 LAB
APOB+LDLR+PCSK9 GENE MUT ANL BLD/T: NOT DETECTED
BRCA1+BRCA2 DEL+DUP + FULL MUT ANL BLD/T: NOT DETECTED
MLH1+MSH2+MSH6+PMS2 GN DEL+DUP+FUL M: NOT DETECTED

## 2024-10-08 ENCOUNTER — ANNUAL EXAM (OUTPATIENT)
Dept: OBGYN CLINIC | Facility: CLINIC | Age: 49
End: 2024-10-08
Payer: COMMERCIAL

## 2024-10-08 VITALS
HEIGHT: 63 IN | SYSTOLIC BLOOD PRESSURE: 112 MMHG | WEIGHT: 190.4 LBS | DIASTOLIC BLOOD PRESSURE: 72 MMHG | BODY MASS INDEX: 33.73 KG/M2

## 2024-10-08 DIAGNOSIS — Z12.31 ENCOUNTER FOR SCREENING MAMMOGRAM FOR MALIGNANT NEOPLASM OF BREAST: ICD-10-CM

## 2024-10-08 DIAGNOSIS — Z01.419 WOMEN'S ANNUAL ROUTINE GYNECOLOGICAL EXAMINATION: Primary | ICD-10-CM

## 2024-10-08 DIAGNOSIS — Z12.11 COLON CANCER SCREENING: ICD-10-CM

## 2024-10-08 PROCEDURE — S0612 ANNUAL GYNECOLOGICAL EXAMINA: HCPCS | Performed by: PHYSICIAN ASSISTANT

## 2024-10-08 NOTE — PROGRESS NOTES
ASSESSMENT & PLAN:   Diagnoses and all orders for this visit:    Women's annual routine gynecological examination    Encounter for screening mammogram for malignant neoplasm of breast  -     Mammo screening bilateral w 3d and cad; Future    Colon cancer screening  -     Ambulatory Referral to Gastroenterology; Future          The following were reviewed in today's visit: ASCCP guidelines, Gardisil vaccination, STD testing breast self exam, mammography screening ordered, STD testing, exercise, healthy diet, and colonoscopy discussed and ordered.    Patient to return to office in yearly for annual exam.     All questions have been answered to her satisfaction.        CC:  Annual Gynecologic Examination  Chief Complaint   Patient presents with    Gynecologic Exam     Patient presents for her yearly exam.  Pap 10/3/23 NILM; HPV Negative  Mammo 3/8/2023-Next mammo scheduled        HPI: Harika Jerez is a 49 y.o.  who presents for annual gynecologic examination.  She has the following concerns:  none at this time.       Health Maintenance:    Exercise: intermittently  Breast exams/breast awareness: yes  Last mammogram:   Colorectal cancer screening: no prior.     Past Medical History:   Diagnosis Date    Chronic kidney disease 4-19       Past Surgical History:   Procedure Laterality Date    HIP SURGERY      Right Hip repair/realignment surgery       Past OB/Gyn History:   Patient's last menstrual period was 2024 (exact date).    Menopausal status: premenopausal  Menopausal symptoms: None    Last Pap:  : no abnormalities  History of abnormal Pap smear: no    Patient is currently sexually active.   STD testing: no  Current contraception: vasectomy      Family History  Family History   Problem Relation Age of Onset    Hyperlipidemia Mother     Hypertension Mother     Lung cancer Father     Cancer Father     Heart attack Maternal Grandfather     Lung cancer Paternal Grandfather        Family  "history of uterine or ovarian cancer: no  Family history of breast cancer: no  Family history of colon cancer: no    Social History:  Social History     Socioeconomic History    Marital status: /Civil Union     Spouse name: Not on file    Number of children: Not on file    Years of education: Not on file    Highest education level: Not on file   Occupational History    Not on file   Tobacco Use    Smoking status: Never    Smokeless tobacco: Never   Vaping Use    Vaping status: Never Used   Substance and Sexual Activity    Alcohol use: Yes     Comment: Rare consumption    Drug use: Never    Sexual activity: Yes     Partners: Male     Birth control/protection: Male Sterilization   Other Topics Concern    Not on file   Social History Narrative    Most recent tobacco use screenin2018     Social Determinants of Health     Financial Resource Strain: Not on file   Food Insecurity: Not on file   Transportation Needs: Not on file   Physical Activity: Not on file   Stress: Not on file   Social Connections: Not on file   Intimate Partner Violence: Not on file   Housing Stability: Not on file     Domestic violence screen: negative    Allergies:  No Known Allergies    Medications:    Current Outpatient Medications:     lisinopril (ZESTRIL) 5 mg tablet, Take 1 tablet (5 mg total) by mouth daily, Disp: 90 tablet, Rfl: 3    Review of Systems:  Review of Systems   Constitutional:  Negative for chills, fever and unexpected weight change.   Respiratory:  Negative for shortness of breath.    Cardiovascular:  Negative for chest pain.   Gastrointestinal:  Negative for abdominal pain, diarrhea, nausea and vomiting.   Skin:  Negative for rash.   Psychiatric/Behavioral:  Negative for dysphoric mood. The patient is not nervous/anxious.          Physical Exam:  /72 (BP Location: Right arm, Patient Position: Sitting, Cuff Size: Standard)   Ht 5' 3\" (1.6 m)   Wt 86.4 kg (190 lb 6.4 oz)   LMP 2024 (Exact Date)   " Breastfeeding No   BMI 33.73 kg/m²    Physical Exam  Constitutional:       Appearance: Normal appearance.   Genitourinary:      Vulva and urethral meatus normal.      No lesions in the vagina.      Right Labia: No rash or lesions.     Left Labia: No lesions or rash.     No vaginal discharge, erythema or bleeding.        Right Adnexa: not tender and no mass present.     Left Adnexa: not tender and no mass present.     No cervical discharge or lesion.      Uterus is not tender.   Breasts:     Breasts are symmetrical.      Right: No mass or skin change.      Left: No mass or skin change.   HENT:      Head: Normocephalic and atraumatic.   Cardiovascular:      Rate and Rhythm: Normal rate and regular rhythm.      Heart sounds: Normal heart sounds. No murmur heard.     No friction rub. No gallop.   Pulmonary:      Effort: Pulmonary effort is normal.      Breath sounds: Normal breath sounds. No wheezing, rhonchi or rales.   Abdominal:      General: Abdomen is flat. There is no distension.      Palpations: Abdomen is soft.      Tenderness: There is no abdominal tenderness.   Musculoskeletal:      Cervical back: Neck supple.   Lymphadenopathy:      Upper Body:      Right upper body: No axillary adenopathy.      Left upper body: No axillary adenopathy.   Neurological:      General: No focal deficit present.      Mental Status: She is alert.   Skin:     General: Skin is warm and dry.   Psychiatric:         Mood and Affect: Mood normal.         Behavior: Behavior normal.   Vitals reviewed.

## 2025-06-10 ENCOUNTER — OFFICE VISIT (OUTPATIENT)
Dept: FAMILY MEDICINE CLINIC | Facility: CLINIC | Age: 50
End: 2025-06-10
Payer: COMMERCIAL

## 2025-06-10 VITALS
HEART RATE: 70 BPM | OXYGEN SATURATION: 99 % | WEIGHT: 192 LBS | TEMPERATURE: 98.4 F | SYSTOLIC BLOOD PRESSURE: 120 MMHG | BODY MASS INDEX: 34.02 KG/M2 | RESPIRATION RATE: 16 BRPM | DIASTOLIC BLOOD PRESSURE: 80 MMHG | HEIGHT: 63 IN

## 2025-06-10 DIAGNOSIS — E66.811 CLASS 1 OBESITY DUE TO EXCESS CALORIES WITHOUT SERIOUS COMORBIDITY WITH BODY MASS INDEX (BMI) OF 33.0 TO 33.9 IN ADULT: Primary | ICD-10-CM

## 2025-06-10 DIAGNOSIS — Z12.11 SCREENING FOR COLON CANCER: ICD-10-CM

## 2025-06-10 DIAGNOSIS — E66.09 CLASS 1 OBESITY DUE TO EXCESS CALORIES WITHOUT SERIOUS COMORBIDITY WITH BODY MASS INDEX (BMI) OF 33.0 TO 33.9 IN ADULT: Primary | ICD-10-CM

## 2025-06-10 PROBLEM — N18.1 STAGE 1 CHRONIC KIDNEY DISEASE: Status: RESOLVED | Noted: 2022-04-11 | Resolved: 2025-06-10

## 2025-06-10 PROCEDURE — 99213 OFFICE O/P EST LOW 20 MIN: CPT | Performed by: FAMILY MEDICINE

## 2025-06-10 NOTE — PROGRESS NOTES
Name: Harika Jerez      : 1975      MRN: [id number]  Encounter Provider: Reji Christianson MD  Encounter Date: 6/10/2025   Encounter department: [facility] MEDICINE  :  Assessment & Plan  Class 1 obesity due to excess calories without serious comorbidity with body mass index (BMI) of 33.0 to 33.9 in adult  Discussed smaller portions, healthy snacks, and regular exercise. Patient has tried diet and exercise for past 6 months and no significant weight loss. Discussed options with patient, and she is interested in trying medication. Will start Wegovy 0.25 mg weekly for 4 weeks and then increase to 0.5 mg weekly. Discussed side effects and proper use.   Orders:  •  Semaglutide-Weight Management (WEGOVY) 0.25 MG/0.5ML; Inject 0.5 mL (0.25 mg total) under the skin once a week for 28 days    Screening for colon cancer    Orders:  •  Ambulatory Referral to Gastroenterology; Future          Depression Screening and Follow-up Plan: Patient was screened for depression during today's encounter. They screened negative with a PHQ-2 score of 0.        History of Present Illness   Patient here to discuss weight loss medications. Patient has been trying diet and exercise for months and no success. Patient has tried Weight Watchers. Wants to try Wegovy.       Review of Systems   Constitutional:  Negative for fatigue and unexpected weight change.   Respiratory:  Negative for cough, chest tightness and shortness of breath.    Cardiovascular:  Negative for chest pain and palpitations.   Gastrointestinal:  Negative for abdominal pain, constipation, diarrhea, nausea and vomiting.   Genitourinary:  Negative for difficulty urinating.   Musculoskeletal:  Negative for arthralgias.   Skin:  Negative for rash.   Neurological:  Negative for dizziness and headaches.       Objective   /80 (BP Location: Left arm, Patient Position: Sitting, Cuff Size: Large)   Pulse 70   Temp 98.4 °F (36.9 °C) (Tympanic)   Resp 16    "Ht 5' 3\" (1.6 m)   Wt 87.1 kg (192 lb)   SpO2 99%   BMI 34.01 kg/m²      Physical Exam  Vitals and nursing note reviewed.   Constitutional:       General: She is not in acute distress.     Appearance: Normal appearance. She is well-developed.   Neck:      Vascular: No carotid bruit.     Cardiovascular:      Rate and Rhythm: Normal rate and regular rhythm.      Heart sounds: No murmur heard.  Pulmonary:      Effort: Pulmonary effort is normal. No respiratory distress.      Breath sounds: Normal breath sounds.     Musculoskeletal:      Cervical back: Normal range of motion and neck supple.      Right lower leg: No edema.      Left lower leg: No edema.   Lymphadenopathy:      Cervical: No cervical adenopathy.     Neurological:      Mental Status: She is alert.     Psychiatric:         Mood and Affect: Mood normal.         Behavior: Behavior normal.         Thought Content: Thought content normal.         Judgment: Judgment normal.         "

## 2025-06-10 NOTE — ASSESSMENT & PLAN NOTE
Discussed smaller portions, healthy snacks, and regular exercise. Patient has tried diet and exercise for past 6 months and no significant weight loss. Discussed options with patient, and she is interested in trying medication. Will start Wegovy 0.25 mg weekly for 4 weeks and then increase to 0.5 mg weekly. Discussed side effects and proper use.   Orders:  •  Semaglutide-Weight Management (WEGOVY) 0.25 MG/0.5ML; Inject 0.5 mL (0.25 mg total) under the skin once a week for 28 days

## 2025-06-11 ENCOUNTER — TELEPHONE (OUTPATIENT)
Age: 50
End: 2025-06-11

## 2025-06-11 NOTE — TELEPHONE ENCOUNTER
SAMARA MARKVY 0.25 MG/0.5ML APPROVED         Patient advised by          []MyChart Message  []Phone call   []LMOM  []L/M to call office as no active Communication consent on file  []Unable to leave detailed message as VM not approved on Communication consent       Pharmacy advised by    [x]Fax  [x]Phone call  []Secure Chat    Specialty Pharmacy    []

## 2025-06-11 NOTE — TELEPHONE ENCOUNTER
SAMARA LORENZ 0.25 MG/0.5ML SUBMITTED    to [email or web address]     via    []CMM-KEY:    [x]Surescripts-Case ID # [id number]  []Availity-Auth ID #  NDC #    []Faxed to plan   []Other website    []Phone call Case ID #      []PA sent as URGENT    All office notes, labs and other pertaining documents and studies sent. Clinical questions answered. Awaiting determination from insurance company.     Turnaround time for your insurance to make a decision on your Prior Authorization can take 7-21 business days.

## 2025-07-18 DIAGNOSIS — E66.09 CLASS 1 OBESITY DUE TO EXCESS CALORIES WITHOUT SERIOUS COMORBIDITY WITH BODY MASS INDEX (BMI) OF 33.0 TO 33.9 IN ADULT: ICD-10-CM

## 2025-07-18 DIAGNOSIS — E66.811 CLASS 1 OBESITY DUE TO EXCESS CALORIES WITHOUT SERIOUS COMORBIDITY WITH BODY MASS INDEX (BMI) OF 33.0 TO 33.9 IN ADULT: ICD-10-CM

## 2025-07-20 DIAGNOSIS — E66.811 CLASS 1 OBESITY DUE TO EXCESS CALORIES WITHOUT SERIOUS COMORBIDITY WITH BODY MASS INDEX (BMI) OF 33.0 TO 33.9 IN ADULT: Primary | ICD-10-CM

## 2025-07-20 DIAGNOSIS — E66.09 CLASS 1 OBESITY DUE TO EXCESS CALORIES WITHOUT SERIOUS COMORBIDITY WITH BODY MASS INDEX (BMI) OF 33.0 TO 33.9 IN ADULT: Primary | ICD-10-CM

## 2025-07-20 RX ORDER — SEMAGLUTIDE 0.5 MG/.5ML
INJECTION, SOLUTION SUBCUTANEOUS
Qty: 2 ML | Refills: 0 | Status: SHIPPED | OUTPATIENT
Start: 2025-07-20

## 2025-07-20 RX ORDER — SEMAGLUTIDE 0.25 MG/.5ML
INJECTION, SOLUTION SUBCUTANEOUS
Qty: 2 ML | Refills: 0 | OUTPATIENT
Start: 2025-07-20